# Patient Record
Sex: FEMALE | Race: WHITE | Employment: FULL TIME | URBAN - METROPOLITAN AREA
[De-identification: names, ages, dates, MRNs, and addresses within clinical notes are randomized per-mention and may not be internally consistent; named-entity substitution may affect disease eponyms.]

---

## 2020-01-10 ENCOUNTER — HOSPITAL ENCOUNTER (EMERGENCY)
Age: 30
Discharge: HOME | End: 2020-01-10
Payer: COMMERCIAL

## 2020-01-10 VITALS
HEIGHT: 62 IN | WEIGHT: 135 LBS | DIASTOLIC BLOOD PRESSURE: 80 MMHG | RESPIRATION RATE: 17 BRPM | BODY MASS INDEX: 24.84 KG/M2 | OXYGEN SATURATION: 99 % | SYSTOLIC BLOOD PRESSURE: 112 MMHG | TEMPERATURE: 97.1 F | HEART RATE: 91 BPM

## 2020-01-10 PROCEDURE — 97140 MANUAL THERAPY 1/> REGIONS: CPT

## 2020-01-10 PROCEDURE — 99282 EMERGENCY DEPT VISIT SF MDM: CPT

## 2020-01-10 PROCEDURE — 97110 THERAPEUTIC EXERCISES: CPT

## 2020-01-10 PROCEDURE — 97161 PT EVAL LOW COMPLEX 20 MIN: CPT

## 2020-01-10 PROCEDURE — 6370000000 HC RX 637 (ALT 250 FOR IP): Performed by: NURSE PRACTITIONER

## 2020-01-10 RX ORDER — METHOCARBAMOL 500 MG/1
500 TABLET, FILM COATED ORAL 3 TIMES DAILY
Qty: 15 TABLET | Refills: 0 | Status: SHIPPED | OUTPATIENT
Start: 2020-01-10 | End: 2020-01-15

## 2020-01-10 RX ORDER — PREDNISONE 20 MG/1
60 TABLET ORAL ONCE
Status: COMPLETED | OUTPATIENT
Start: 2020-01-10 | End: 2020-01-10

## 2020-01-10 RX ORDER — PREDNISONE 20 MG/1
TABLET ORAL
Qty: 18 TABLET | Refills: 0 | Status: SHIPPED | OUTPATIENT
Start: 2020-01-10

## 2020-01-10 RX ORDER — METHOCARBAMOL 500 MG/1
500 TABLET, FILM COATED ORAL ONCE
Status: DISCONTINUED | OUTPATIENT
Start: 2020-01-10 | End: 2020-01-10 | Stop reason: HOSPADM

## 2020-01-10 RX ADMIN — PREDNISONE 60 MG: 20 TABLET ORAL at 10:51

## 2020-01-10 ASSESSMENT — PAIN DESCRIPTION - ORIENTATION: ORIENTATION: RIGHT

## 2020-01-10 ASSESSMENT — ENCOUNTER SYMPTOMS
ABDOMINAL PAIN: 0
COLOR CHANGE: 0
RHINORRHEA: 0
SHORTNESS OF BREATH: 0
SORE THROAT: 0

## 2020-01-10 ASSESSMENT — PAIN SCALES - GENERAL: PAINLEVEL_OUTOF10: 10

## 2020-01-10 ASSESSMENT — PAIN DESCRIPTION - PAIN TYPE: TYPE: ACUTE PAIN

## 2020-01-10 ASSESSMENT — PAIN DESCRIPTION - LOCATION: LOCATION: NECK

## 2020-01-10 NOTE — PROGRESS NOTES
Outpatient Physical Therapy Phone: [phone], Fax: [phone]   ED Physical Therapy Phone: [phone]    CERVICAL, THORACIC SPINE, AND UPPER EXTREMITY PHYSICAL THERAPY EVALUATION  EMERGENCY DEPARTMENT     Received referral for Physical Therapy Evaluation in the Emergency Department. Pt educated to role of PT in the ED, and pt agreeable to proceed with evaluation. Evaluation Date: 1/10/2020   Patient Name: Octavia Villarreal   YOB: 1990    Medical Diagnosis:  Neck pain  Treatment Diagnosis:  1st/2nd rib dys, muscle strain/spasm R UT and R levator scap  Onset Date:   2 weeks  Referral Date: 1/10/2020   Referring Provider: VIMAL Carranza CNP  Restrictions/Precautions: none per NP      SUBJECTIVE FINDINGS    History of Present Illness:      Pt presents to ED with c/o R-sided neck pain. Pt first noticed pain apx 2 weeks ago after moving into a new place and sleeping on an air mattress. States she was able to massage and ease pain but this morning pain was worse. Denies headaches. Denies numbness/tingling. 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 Pain    Patient describes pain to be achy, dull, sore   Patient reports /10 pain at present,  8-9/10 pain at its worst  Worsened by RUE movements  Improved by Ibuprofen, massaging  Pain increases by coughing, sneezing, and laughing:  []   Yes [x]   No    Current Functional Limitations:   PLOF:   Pt. Sleep affected?  []   Yes []   No   []N/A      OBJECTIVE FINDINGS    Imaging Results:  Performed in ED today, 1/10/2020: None indicated    [employer]   [x]   Rule does not apply if any of the following are present:     -No Neck pain  -Unstable Vital Signs     -Non Trauma   -Known vertebral disease      -GCS <15   -Acute Paralysis   -Age <16  -Previous C-Spine Surgery    []  Imaging Indicated if:  []   Age 72 or older  []   Paresthesia in extremities  []   Dangerous Mechanism     []   Low Risk Factors allowing safe ROM  []   Simple Rear-End MVC  []   Sitting position in ED  []   Ambulatory into ED  []   Delayed onset of neck pain (Not immediate at time of injury)  []   Absence of MidlineCspine tenderness     []   Actively rotates head to 45 degrees bilat       Posture    [x]   Forward head  []   Forward flexed trunk   []   Pronounced CT junction    []   Increased thoracic kyphosis   []   Increased lumbar lordosis   []   Scoliosis   []   Swayback  []   Decreased WB on   []   R   []   L   []   Scapular winging  []   Other:       Palpation/Tenderness/Visual Inspection      Patient reported tenderness with palpation  [x]   Yes   []   No   []   NT  Location:  R UT, R levator scap, R cervical paraspinals  PT notes warmth  []   Yes   [x]   No   []   NT  Location:   PT notes increased muscle tone   []   Yes   [x]   No   []   NT  Location:   Ligament tenderness/provocation:   []   Yes   [x]   No   []   NT  Location:      Range of Motion/Strength Testing/Myotomes    [x] All ROM and strength WNL except as marked below  *denotes pain  Range Tested AROM PROM Resisted/Myotomes    Left (or  neutral)  Right Left Right Left (or neutral) Right   Cervical Flex (C1-2) Dec 25%        Cervical Ext Dec 25%        Cervical SB (C3) Dec 25% Dec 50%       Cervical Rot Dec 25% Dec 25%       Shoulder Shrug (C4)         Shoulder Flex  Dec 25%       Shoulder Ext         Shoulder Abd (C5)  Dec 25%       Shoulder Add         Shoulder IR         Shoulder ER          Elbow flex (C6)         Elbow ext (C7)         Wrist ext (C6)         Wrist flex (C7)         Supination          Pronation         Thumb Ext (C8)         Intrinsics (T1)           UE Dermatomes     [x] All dermatomes WNL except as marked below   Dermatomes  Left  Right Comments   Posterior Head (C2)      Lateral Upper Neck (C3)      Supraclavicular (C4)      Lateral Upper Arm (C5)      Lateral Forearm/1st (C6)      3rd digit (C7)      5th digit (C8)      Medial Arm (T1)        Reflexes      [x]

## 2020-01-10 NOTE — ED PROVIDER NOTES
were reviewed and negative. PAST MEDICAL HISTORY   History reviewed. No pertinent past medical history. SURGICAL HISTORY       Past Surgical History:   Procedure Laterality Date    FINGER SURGERY Left          CURRENT MEDICATIONS       Previous Medications    No medications on file         ALLERGIES     Patient has no known allergies. FAMILY HISTORY     History reviewed. No pertinent family history. SOCIAL HISTORY       Social History     Socioeconomic History    Marital status: Single     Spouse name: None    Number of children: None    Years of education: None    Highest education level: None   Occupational History    None   Social Needs    Financial resource strain: None    Food insecurity:     Worry: None     Inability: None    Transportation needs:     Medical: None     Non-medical: None   Tobacco Use    Smoking status: Never Smoker   Substance and Sexual Activity    Alcohol use: Yes     Comment: occassionally    Drug use: Not Currently    Sexual activity: None   Lifestyle    Physical activity:     Days per week: None     Minutes per session: None    Stress: None   Relationships    Social connections:     Talks on phone: None     Gets together: None     Attends Jewish service: None     Active member of club or organization: None     Attends meetings of clubs or organizations: None     Relationship status: None    Intimate partner violence:     Fear of current or ex partner: None     Emotionally abused: None     Physically abused: None     Forced sexual activity: None   Other Topics Concern    None   Social History Narrative    None       SCREENINGS             PHYSICAL EXAM  (up to 7 for level 4, 8 or more for level 5)     ED Triage Vitals [01/10/20 0916]   BP Temp Temp src Pulse Resp SpO2 Height Weight   112/80 97.1 °F (36.2 °C) -- 91 17 99 % 5' 2\" (1.575 m) 135 lb (61.2 kg)       Physical Exam  Constitutional:       Appearance: She is well-developed.    HENT: words are mis-transcribed.)    VIMAL Montiel CNP (electronically signed)         VIMAL Montiel CNP  01/10/20 1034

## 2020-01-15 ENCOUNTER — HOSPITAL ENCOUNTER (OUTPATIENT)
Dept: PHYSICAL THERAPY | Age: 30
Setting detail: THERAPIES SERIES
Discharge: HOME | End: 2020-01-15
Payer: COMMERCIAL

## 2020-01-15 PROCEDURE — 97161 PT EVAL LOW COMPLEX 20 MIN: CPT

## 2020-01-15 PROCEDURE — 97140 MANUAL THERAPY 1/> REGIONS: CPT

## 2020-01-15 PROCEDURE — 97110 THERAPEUTIC EXERCISES: CPT

## 2020-01-15 NOTE — PROGRESS NOTES
The following patient has been evaluated for physical therapy services. In order for therapy to continue, physician review of the treatment plan is needed. Please review the attached evaluation and/or summary of the patient's plan of care, and verify that you agree by signing below and sending it back to our office. Thank you for this referral.    Physician signature_______________________ Date________________    Fax to:   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 Social support/Environment:     Family/caregiver support:   [x]Yes   []No    Health History reviewed with pt:   [x]Yes   []No      History of R rotator cuff strain 1yr ago, treated non surgically and had physical therapy for a month. Reported relief post therapy session. SUBJECTIVE FINDINGS      History of Present Illness:  31yrs old female presented with R sided neck pain which started gradually with increase in pain with neck rotations during driving, overhead activities. She also reported stiffness in the early morning in the neck with difficulty to elevate B/L UEs above shoulder last Friday (Jan 10th, 2020) for which visited ED, had PT evaluation and had relief.  Patient currently presented 5/10 pain on the R sided neck (C3-C4) extending to R trapezius and R scapula. Patient did not report any weakness in the UEs, pins and needle sensation in the neck extending to the bilateral UEs. Pain       Patient describes pain to be R sided neck pain R sided neck to upper trapezius and and R scapula. 5/10 pain on neck movement. Rotations to L painful. Patient reports pain is  5/10 pain at present and  7/10 pain at its worst.  Worsened by neck rotations (driving), overhead activities. Improved by  Hot packs, lying down and sitting with moving the neck. Pt. reports pain with coughing, sneezing and laughing:   []Yes   [x]No    []NA      Current Functional Limitations:   [x]Yes   []No  Functional Complaints:    Driving for 1 hour a day. , sleeping   PLOF:   [x]No functional limitations   []Pt with previous functional limitations  Pt's sleep is affected? [x]Yes   []No    Wakes up a lot     Patient goal for therapy: \"To move the neck without pain\"    OBJECTIVE FINDINGS      Gait/Steps/Balance       [x]Gait WNL unless otherwise noted below:                             Deviations on a level linoleum surface include:      Balance tests (Negative)     Hautard's test:  [x]Neg  []Pos with R rot  []Pos with L rot  []Pos with ext  []NT  Romberg's sign:  EO    Sec,      Sway;   EC    Sec,      Sway     []NT  Unilateral stance:   EO R:    Sec;  EO L   Sec;   EC R:   Sec;  EC L   Sec     []NT     Posture:   [x]Forward head  []Forward flexed trunk   []Pronounced CT junction    [x]Increased thoracic kyphosis   []Increased lumbar lordosis   []Scoliosis   []Swayback  []Decreased WB on   []R   []L   []Scapular winging  [x]Other:  L sided thoracic non structural scoliosis extending from T3-T8.  (Scoliosis got corrected with forward bending and lying down)     Range of Motion   [x]Cervical Spine   []Thoracic Spine     Range Tested AROM PROM MMT/Resisted Tests   Flexion 50 degrees WFL    Extension 50 degrees [facility] Forearm/1st(C6)   Elbow flexion (C6)     3rd digit (C7)   Elbow extension (C7)     5th digit (C8)    Wrist extension (C6)     Medial Arm (T1)    Wrist flexion (C7)        Thumb Extension (C8)        Intrinsics (T1)      Comments:      Flexibility     Muscle Findings   Pectoralis Minor []WNL   [x]Decreased R   []Decreased L   []NT   Levator Scapula []WNL   [x]Decreased R   []Decreased L   []NT   Scalenes []WNL   []Decreased R   []Decreased L   []NT   Upper Trapezius  []WNL   [x]Decreased R   []Decreased L   []NT   Suboccipitals  []WNL   []Decreased R   []Decreased L   []NT   Other:  []WNL   []Decreased R   []Decreased L   []NT     Special Tests- cervical/thoracic seated     Special Test Findings   Icelandic c-spine rules  [x]Neg   []Pos   []NA   Adrianna Law [x]Neg   []Pos   []NT   Vertebral Artery/Positional Provocative Testing [x]Neg   []Pos R   []Pos L   []NT   Spurling's/Foraminal []Neg   []Pos R   []Pos L   []NT   Distraction [x]Relief noted   []No relief noted   []NT   Compression  []Neg   []Pos   [x]NT   Elevated Arm Stress Test (EAST)  [x]Neg   []Pos R   []Pos L   []NT   Thoracic Outlet   Other:  [x]Neg   []Pos R   []Pos L   []NT   Other:  []Neg   []Pos R   []Pos L   []NT   Other:  []Neg   []Pos R   []Pos L   []NT     Special Tests- UE seated     Special Test Findings   Empty can test/supraspinatus [x]Neg   []Pos R   []Pos L   []NT   Drop arm test [x]Neg   []Pos R   []Pos L   []NT   Neer's impingement []Neg   []Pos R   []Pos L   [x]NT   Speed's test  []Neg   []Pos R   []Pos L   [x]NT   Elbow varus stress []Neg   []Pos R   []Pos L   [x]NT   Elbow valgus stress  []Neg   []Pos R   []Pos L   [x]NT   Tinel's sign  []Neg   []Pos R   []Pos L   [x]NT  []Elbow   []Wrist    Finkelstein's test []Neg   []Pos R   []Pos L   [x]NT   Phalen's test  []Neg   []Pos R   []Pos L   [x]NT   Other []Neg   []Pos R   []Pos L   [x]NT   Other  []Neg   []Pos R   []Pos L   [x]NT     Palpation     Patient reported tenderness with palpation: Training   [x]Therapeutic Activity  []Other     Thank you for the referral of this patient.       Timed Code Treatment Minutes: 45 minutes     Total Treatment Time: 70 minutes    Stephanie Baird PT  License #

## 2020-01-15 NOTE — FLOWSHEET NOTE
Outpatient Physical Therapy     [x] Daily Treatment Note   [] Progress Note   [] Discharge Note    Date:  1/15/2020    Patient Name:  Rocio Cohen         YOB: 1990    Medical Diagnosis:   Torticollis                                        ICD 10:  M43.6     Treatment Diagnosis: 1st rib dysfunction, muscle strain/spasm R UT and R levator scapulae with nonstructural scoliosis (Thoracic extending from T3 - T8)                                 Onset Date: 3 weeks ago     Referral Date: 01/10/2020          Referring Physician: Jose A Kendrick (APRN - CNP)                                                    Visits Allowed/Insurance/Certification Information: 20 per calender year/ Co-pay: 30     Restrictions/Precautions: None    Plan of care sent to provider:      [x]Faxed  []Co-signature    (attempts: 1[x] 2 []3[])         Plan of care signed:      []Yes date:            []No      Progress Note covers period from (if applicable):    [x]NA    []From          To           Next Progress Note due:       Visit# / total visits:  1/7    Plan for Next Session:  Cervical manual traction, Scapular musculature strengthening, stretching of the R upper trapezius and R levator scapulae, modalities as needed. Subjective:  See eval     Pain level:   AT EVAL: Patient describes pain to be R sided neck pain R sided neck to upper trapezius and and R scapula. 5/10 pain on neck movement. Rotations to L painful. Patient reports pain is  5/10 pain at present and  7/10 pain at its worst.  Worsened by neck rotations (driving), overhead activities. Improved by  Hot packs, lying down and sitting with moving the neck. Objective:       Exercises:    Exercises in bold performed in department today. Items not bolded are carried forward from prior visits for continuity of the record.   Exercise/Equipment Resistance/Repetitions HEP Other comments     Neck ROM in supine and in sitting in all available planes of motion 10 reps x  3 sets/ 3 x day [x]      Chin tucks with towel roll under occiput in supine position 10 reps x 10 sec hold [x]      Chin tucks with neck flexion in supine position 10 reps x 2 sets [x]      Pectoralis minor stretches in supine 10 reps x 10 sec hold [x]      Upper trapezius and Levator scapulae stretches for R side  10 reps x 10 sec hold [x]      Scapular retraction exercise 10 reps x 2 sets []      Forward bending with UE support while in seated position 10 reps []        []        []          []         []        []        []        []        []        []        []        []      Therapeutic Exercise/Home Exercise Program: 30 minutes  See above  HEP was explained and demonstrated to the patient. Patient verbalized understanding. Group Therapy:    0 minutes    Therapeutic Activity:  0 minutes     Gait: 0 minutes    Neuromuscular Re-Education:  0 minutes      Canalith Repositioning Procedure:  0 minutes    Manual Therapy:  15 minutes  Patient was provided with Midthoracic spine manipulation, Cervicothoracic junction manipulation to Improve sagittal plane motion. Sagittal plane ROM of the neck improved from 90 degree to 110 degrees. Followed  1st Rib mobilization,  R upper trap and R Lev scapulae stretch, Trigger point release on R upper trap. Cervical manual traction for 1 min hold x 5 reps   Patient showed Improved R rotation AROM from 50 to 70 degrees. L side bending improved from 38 degrees to 45 degrees with neck pain reduced to 2/10 on VAS.     Modalities: 0 minutes    Functional Outcome Measure:   []NA  Measure Used: Oswestry Neck Disability Index  Date Assessed: 01/15/2020  Score: 12/50 (24% disability, moderate disability)    Assessment/Treatment/Activity Tolerance:    Patients response to treatment:   [x]Patient tolerated treatment well []Patient limited by fatigue   []Patient limited by pain  []Patient limited by other medical complications   []Other:     Goals:   Progress towards goals:  Goals established on 1/15/20     GOALS    Short Term Goals:     weeks Long Term Goals:     weeks   1). Establish HEP 1). Pt independent with HEP   2). Pain  3/10 or less 2). Pain 1 /10 or less   3). Patient will improve neck ROM on the L side with 10% decrease 3). Patient will be able to maintain normal neck AROM. 4). Patient will improve scapular musculature strength by 1 grade. 4). Patient will maintain scapular musculature strength of 5/5   5). Patient will show Oswestry neck disability index score to 15% disability 5). Patient will show Oswestry neck disability index score to 0% disability   6). 6).          Prognosis: [x]Good   []Fair   []Poor    Patient Requires Follow-up:  [x]Yes  []No    Plan: [x]Plan of care initiated     []Continue per plan of care    [] Alter current plan (see comments)    []Hold pending MD visit []Discharge    Timed Code Treatment Minutes: 45 minutes    Total Treatment Minutes:  70 minutes    Medicare Cap total YTD:   []N/A    Workers Comp Time Stamp  [x]N/A   Time In:   Time Out:    Electronically signed by:  June Quiroz PT

## 2020-01-21 ENCOUNTER — HOSPITAL ENCOUNTER (OUTPATIENT)
Dept: PHYSICAL THERAPY | Age: 30
Setting detail: THERAPIES SERIES
Discharge: HOME | End: 2020-01-21
Payer: COMMERCIAL

## 2020-01-21 PROCEDURE — 97110 THERAPEUTIC EXERCISES: CPT

## 2020-01-21 PROCEDURE — 97140 MANUAL THERAPY 1/> REGIONS: CPT

## 2020-01-21 NOTE — FLOWSHEET NOTE
Outpatient Physical Therapy     [x] Daily Treatment Note   [] Progress Note   [] Discharge Note    Date:  1/21/2020    Patient Name:  Filiberto Alarcon         YOB: 1990    Medical Diagnosis:   Torticollis                                        ICD 10:  M43.6     Treatment Diagnosis: 1st rib dysfunction, muscle strain/spasm R UT and R levator scapulae with nonstructural scoliosis (Thoracic extending from T3 - T8)                                 Onset Date: 3 weeks ago     Referral Date: 01/10/2020          Referring Physician: Charis LEWIS - ILANA)                                                    Visits Allowed/Insurance/Certification Information: 20 per calender year/ Co-pay: 30     Restrictions/Precautions: None    Plan of care sent to provider:      [x]Faxed  []Co-signature    (attempts: 1[x] 2 []3[])         Plan of care signed:      []Yes date:            []No      Progress Note covers period from (if applicable):    [x]NA    []From          To           Next Progress Note due:       Visit# / total visits:  2/7    Plan for Next Session:  Cervical manual traction, Scapular musculature strengthening, stretching of the R upper trapezius and R levator scapulae, modalities as needed. Subjective:    A lot of relief since last visit. Soreness beginning to come back this morning with some more soreness  Has performed stretches 2x since last visit  Has been able to put hair up easier with improved shoulder ER     Pain level: Currently: 2/10 R medial scapular; no pain at end of session  AT EVAL: Patient describes pain to be R sided neck pain R sided neck to upper trapezius and and R scapula. 5/10 pain on neck movement. Rotations to L painful. Patient reports pain is  5/10 pain at present and  7/10 pain at its worst.  Worsened by neck rotations (driving), overhead activities. Improved by  Hot packs, lying down and sitting with moving the neck.        Objective:       Exercises:

## 2020-01-23 ENCOUNTER — HOSPITAL ENCOUNTER (OUTPATIENT)
Dept: PHYSICAL THERAPY | Age: 30
Setting detail: THERAPIES SERIES
Discharge: HOME | End: 2020-01-23
Payer: COMMERCIAL

## 2020-01-23 PROCEDURE — 97140 MANUAL THERAPY 1/> REGIONS: CPT

## 2020-01-23 PROCEDURE — 97110 THERAPEUTIC EXERCISES: CPT

## 2020-01-23 NOTE — PROGRESS NOTES
Patient was seen for 3 Visits of PT. Initial eval date 01/15/2020. Mary Pedro Patient was not seen for additional visits due to meeting all goals. and to feeling better. As of 01/23/2020., pt MET 5/5 STGs and 5/5 LTGs. Pt has been discharged at this time, with recommendation to continue HEP as prepared. Please see attached treatment note for most recent status. Thank you for your referral of this patient. Wilmer Fischer, PT, DPT, OMT-C #[id number]          Outpatient Physical Therapy     [x] Daily Treatment Note   [] Progress Note   [x] Discharge Note    Date:  1/23/2020    Patient Name:  Liseth Mckinnon         YOB: 1990    Medical Diagnosis:   Torticollis                                        ICD 10:  M43.6     Treatment Diagnosis: 1st rib dysfunction, muscle strain/spasm R UT and R levator scapulae with nonstructural scoliosis (Thoracic extending from T3 - T8)                                 Onset Date: 3 weeks ago     Referral Date: 01/10/2020          Referring Physician: Miranda Stratton (APRN - CNP)                                                    Visits Allowed/Insurance/Certification Information: 20 per calender year/ Co-pay: 30     Restrictions/Precautions: None     Plan of care sent to provider:      [x]Faxed  []Co-signature    (attempts: 1[x] 2 []3[])         Plan of care signed:      []Yes date:            [x]No      Progress Note covers period from (if applicable):    []NA    [x]From   1/15/2020       To     1/23/20      Next Progress Note due:    NA: pt to DC    Visit# / total visits:  3/7    Plan for Next Session:  NA: pt to DC    Subjective:     Pt reports she has felt good since last session.  Mild soreness in R upper trap  Has been able to perform stretches but has not had time to perform strengthening exercises  Feels she is close to baseline and would like today to be last visit     Pain level: Currently: 0/10 ; no pain at end of session  AT EVAL: well as OB/GYN per patient request.   Discussed self management with self mobilization techniques as well as trigger point management    Scapular Strength    []? All WNL (5/5) except as marked below (measured out of 5)  []? NT                Scapula Left Right   Upper Trapezius       Middle Trapezius   5/5   Lower Trapezius   5/5   Rhomboid   5/5   Serratus Anterior     5/5    Latissimus Dorsi           Group Therapy:    0 minutes    Therapeutic Activity:  0 minutes     Gait: 0 minutes    Neuromuscular Re-Education:  0 minutes      Canalith Repositioning Procedure:  0 minutes    Manual Therapy:  20 minutes   Seated upper thoracic distraction/LIFT - significant cavitation noted  Seated CT distraction (\"Bear Hug\")  1st and 2nd rib mobility - WNL today  ISTM to R upper trap with HG2 sweeping technique   Relief achieved at end of session  Educated on myofascial affects especially through upper trap      Modalities: 0 minutes    Functional Outcome Measure:   []NA  Measure Used: Oswestry Neck Disability Index  Date Assessed: 01/15/2020  Score: 12/50 (24% disability, moderate disability)    Re-assessed 1/23/20  Score: 1/50 = 2% disability     Assessment/Treatment/Activity Tolerance:  Pt with significant improvement in pain, cervical ROM and scapular strength functioning at baseline level. Educated patient on self management as well as option of returning for therapy at a later time if issue returns. Pt with good understanding of HEP to continue      Patients response to treatment:   [x]Patient tolerated treatment well []Patient limited by fatigue   []Patient limited by pain  []Patient limited by other medical complications   []Other:     Goals:   Progress towards goals:  As of 1/23/20 5/5 STGs and 5/5 LTGs    GOALS    Short Term Goals:     weeks Long Term Goals:     weeks   1). Establish HEP - MET 1). Pt independent with HEP - MET   2). Pain  3/10 or less - MET 2). Pain 1 /10 or less - MET   3).  Patient will improve neck ROM on the L side with 10% decrease - MET 3). Patient will be able to maintain normal neck AROM. - MET   4). Patient will improve scapular musculature strength by 1 grade.- MET 4). Patient will maintain scapular musculature strength of 5/5 - MET   5). Patient will show Oswestry neck disability index score to 15% disability - MET 5). Patient will show Oswestry neck disability index score to 0% disability - Mostly MET (scoring 2% from initial 24%)   6). 6).         Patient Requires Follow-up:  []Yes  [x]No    Plan: []Plan of care initiated     []Continue per plan of care    [] Alter current plan (see comments)    []Hold pending MD visit [x]Discharge    Timed Code Treatment Minutes: 30 minutes    Total Treatment Minutes:  30 minutes      Electronically signed by:  Tamra Alfonso PT, DPT, OMT-C #[id number]

## 2020-01-28 ENCOUNTER — HOSPITAL ENCOUNTER (OUTPATIENT)
Dept: PHYSICAL THERAPY | Age: 30
Setting detail: THERAPIES SERIES
End: 2020-01-28
Payer: COMMERCIAL

## 2020-01-30 ENCOUNTER — APPOINTMENT (OUTPATIENT)
Dept: PHYSICAL THERAPY | Age: 30
End: 2020-01-30
Payer: COMMERCIAL

## 2020-07-22 ENCOUNTER — OFFICE VISIT (OUTPATIENT)
Dept: INTERNAL MEDICINE CLINIC | Age: 30
End: 2020-07-22
Payer: COMMERCIAL

## 2020-07-22 VITALS
SYSTOLIC BLOOD PRESSURE: 120 MMHG | BODY MASS INDEX: 32.97 KG/M2 | HEIGHT: 61 IN | OXYGEN SATURATION: 99 % | HEART RATE: 87 BPM | TEMPERATURE: 97.8 F | WEIGHT: 174.6 LBS | DIASTOLIC BLOOD PRESSURE: 70 MMHG

## 2020-07-22 LAB
A/G RATIO: 1.7 (ref 1.1–2.2)
ALBUMIN SERPL-MCNC: 4.5 G/DL (ref 3.4–5)
ALP BLD-CCNC: 44 U/L (ref 40–129)
ALT SERPL-CCNC: 20 U/L (ref 10–40)
ANION GAP SERPL CALCULATED.3IONS-SCNC: 13 MMOL/L (ref 3–16)
AST SERPL-CCNC: 15 U/L (ref 15–37)
BASOPHILS ABSOLUTE: 0.1 K/UL (ref 0–0.2)
BASOPHILS RELATIVE PERCENT: 0.8 %
BILIRUB SERPL-MCNC: 0.4 MG/DL (ref 0–1)
BUN BLDV-MCNC: 18 MG/DL (ref 7–20)
CALCIUM SERPL-MCNC: 9.3 MG/DL (ref 8.3–10.6)
CHLORIDE BLD-SCNC: 104 MMOL/L (ref 99–110)
CHOLESTEROL, TOTAL: 208 MG/DL (ref 0–199)
CO2: 20 MMOL/L (ref 21–32)
CREAT SERPL-MCNC: 0.7 MG/DL (ref 0.6–1.1)
EOSINOPHILS ABSOLUTE: 0.1 K/UL (ref 0–0.6)
EOSINOPHILS RELATIVE PERCENT: 1.8 %
GFR AFRICAN AMERICAN: >60
GFR NON-AFRICAN AMERICAN: >60
GLOBULIN: 2.7 G/DL
GLUCOSE BLD-MCNC: 89 MG/DL (ref 70–99)
HCT VFR BLD CALC: 41.6 % (ref 36–48)
HDLC SERPL-MCNC: 64 MG/DL (ref 40–60)
HEMOGLOBIN: 14 G/DL (ref 12–16)
LDL CHOLESTEROL CALCULATED: 136 MG/DL
LYMPHOCYTES ABSOLUTE: 2.2 K/UL (ref 1–5.1)
LYMPHOCYTES RELATIVE PERCENT: 33.5 %
MCH RBC QN AUTO: 29.4 PG (ref 26–34)
MCHC RBC AUTO-ENTMCNC: 33.7 G/DL (ref 31–36)
MCV RBC AUTO: 87.3 FL (ref 80–100)
MONOCYTES ABSOLUTE: 0.4 K/UL (ref 0–1.3)
MONOCYTES RELATIVE PERCENT: 6.1 %
NEUTROPHILS ABSOLUTE: 3.8 K/UL (ref 1.7–7.7)
NEUTROPHILS RELATIVE PERCENT: 57.8 %
PDW BLD-RTO: 13.6 % (ref 12.4–15.4)
PLATELET # BLD: 313 K/UL (ref 135–450)
PMV BLD AUTO: 7.6 FL (ref 5–10.5)
POTASSIUM SERPL-SCNC: 4.2 MMOL/L (ref 3.5–5.1)
RBC # BLD: 4.77 M/UL (ref 4–5.2)
SODIUM BLD-SCNC: 137 MMOL/L (ref 136–145)
TOTAL PROTEIN: 7.2 G/DL (ref 6.4–8.2)
TRIGL SERPL-MCNC: 42 MG/DL (ref 0–150)
VLDLC SERPL CALC-MCNC: 8 MG/DL
WBC # BLD: 6.6 K/UL (ref 4–11)

## 2020-07-22 PROCEDURE — 1036F TOBACCO NON-USER: CPT | Performed by: INTERNAL MEDICINE

## 2020-07-22 PROCEDURE — 36415 COLL VENOUS BLD VENIPUNCTURE: CPT | Performed by: INTERNAL MEDICINE

## 2020-07-22 PROCEDURE — 99203 OFFICE O/P NEW LOW 30 MIN: CPT | Performed by: INTERNAL MEDICINE

## 2020-07-22 PROCEDURE — 90715 TDAP VACCINE 7 YRS/> IM: CPT | Performed by: INTERNAL MEDICINE

## 2020-07-22 PROCEDURE — G8427 DOCREV CUR MEDS BY ELIG CLIN: HCPCS | Performed by: INTERNAL MEDICINE

## 2020-07-22 PROCEDURE — G8417 CALC BMI ABV UP PARAM F/U: HCPCS | Performed by: INTERNAL MEDICINE

## 2020-07-22 PROCEDURE — 90471 IMMUNIZATION ADMIN: CPT | Performed by: INTERNAL MEDICINE

## 2020-07-22 PROCEDURE — 99385 PREV VISIT NEW AGE 18-39: CPT | Performed by: INTERNAL MEDICINE

## 2020-07-22 RX ORDER — CETIRIZINE HYDROCHLORIDE 10 MG/1
10 TABLET ORAL DAILY
COMMUNITY

## 2020-07-22 ASSESSMENT — PATIENT HEALTH QUESTIONNAIRE - PHQ9
1. LITTLE INTEREST OR PLEASURE IN DOING THINGS: 0
SUM OF ALL RESPONSES TO PHQ9 QUESTIONS 1 & 2: 0
SUM OF ALL RESPONSES TO PHQ QUESTIONS 1-9: 0
SUM OF ALL RESPONSES TO PHQ QUESTIONS 1-9: 0
2. FEELING DOWN, DEPRESSED OR HOPELESS: 0

## 2020-07-22 NOTE — PROGRESS NOTES
[facility]  History and Physical  Rabia Kaur M.D. Silas Fong  YOB: 1990    Date of Service:  7/22/2020    Chief Complaint:   Silas oFng is a 26 y.o. female who presents for   Chief Complaint   Patient presents with   Christian Establish Care    Neck Pain       HPI: Here for Annual Physical and Follow up. She complain of right neck pain while in the shower washing her hair 3 days ago. She tried 2 Advil without relief and prefer not to take med and go for PT instead since it did help in the past.    No results found for: LABA1C, LABMICR  No results found for: NA, K, CL, CO2, BUN, CREATININE, GLUCOSE, CALCIUM  No results found for: CHOL, TRIG, HDL, LDLCALC, LDLDIRECT  No results found for: ALT, AST  No results found for: TSH, T4FREE  No results found for: WBC, HGB, HCT, MCV, PLT  No results found for: INR   No results found for: PSA   No results found for: LABURIC     Wt Readings from Last 3 Encounters:   07/22/20 174 lb 9.6 oz (79.2 kg)   01/10/20 135 lb (61.2 kg)     BP Readings from Last 3 Encounters:   07/22/20 120/70   01/10/20 112/80       There is no problem list on file for this patient.       No Known Allergies  Outpatient Medications Marked as Taking for the 7/22/20 encounter (Office Visit) with Tana Toledo MD   Medication Sig Dispense Refill    cetirizine (ZYRTEC) 10 MG tablet Take 10 mg by mouth daily         Past Medical History:   Diagnosis Date    GERD (gastroesophageal reflux disease)      Past Surgical History:   Procedure Laterality Date    FINGER SURGERY Left 2007     Family History   Problem Relation Age of Onset    Cancer Maternal Grandmother         pancreatic    Breast Cancer Paternal Grandmother     Cancer Paternal Grandmother      Social History     Socioeconomic History    Marital status: Single     Spouse name: Not on file    Number of children: Not on file    Years of education: Not on file    Highest education level: Not on file Occupational History    Occupation:    Social Needs    Financial resource strain: Not on file    Food insecurity     Worry: Not on file     Inability: Not on file   [employer] needs     Medical: Not on file     Non-medical: Not on file   Tobacco Use    Smoking status: Never Smoker    Smokeless tobacco: Never Used   Substance and Sexual Activity    Alcohol use: Yes     Comment: occassionally    Drug use: Never    Sexual activity: Yes   Lifestyle    Physical activity     Days per week: Not on file     Minutes per session: Not on file    Stress: Not on file   Relationships    Social connections     Talks on phone: Not on file     Gets together: Not on file     Attends Church service: Not on file     Active member of club or organization: Not on file     Attends meetings of clubs or organizations: Not on file     Relationship status: Not on file    Intimate partner violence     Fear of current or ex partner: Not on file     Emotionally abused: Not on file     Physically abused: Not on file     Forced sexual activity: Not on file   Other Topics Concern    Not on file   Social History Narrative    Not on file       Review of Systems:  A comprehensive review of systems was negative except for what was noted in the HPI. Physical Exam:   Vitals:    07/22/20 1247   BP: 120/70   Pulse: 87   Temp: 97.8 °F (36.6 °C)   TempSrc: Infrared   SpO2: 99%   Weight: 174 lb 9.6 oz (79.2 kg)   Height: 5' 1\" (1.549 m)     Body mass index is 32.99 kg/m². Constitutional: She is oriented to person, place, and time. She appears well-developed and well-nourished. No distress. HEENT:   Head: Normocephalic and atraumatic. Right Ear: Tympanic membrane, external ear and ear canal normal.   Left Ear: Tympanic membrane, external ear and ear canal normal.   Mouth/Throat: Oropharynx is clear and moist, and mucous membranes are normal.  There is no cervical adenopathy.   Eyes: Conjunctivae and extraocular motions are normal. Pupils are equal, round, and reactive to light. Neck: Supple. No JVD present. Carotid bruit is not present. No mass and no thyromegaly present. Cardiovascular: Normal rate, regular rhythm, normal heart sounds and intact distal pulses. Exam reveals no gallop and no friction rub. No murmur heard. Pulmonary/Chest: Effort normal and breath sounds normal. No respiratory distress. She has no wheezes, rhonchi or rales. Abdominal: Soft, non-tender. Bowel sounds and aorta are normal. She exhibits no organomegaly, mass or bruit. Musculoskeletal: Normal range of motion, no synovitis. She exhibits no edema. Neurological: She is alert and oriented to person, place, and time. She has normal reflexes. No cranial nerve deficit. Coordination normal.   Skin: Skin is warm and dry. There is no rash or erythema. No suspicious lesions noted. Psychiatric: She has a normal mood and affect.  Her speech is normal and behavior is normal. Judgment, cognition and memory are normal.       Preventive Care:  Health Maintenance   Topic Date Due    Varicella vaccine (1 of 2 - 2-dose childhood series) 02/22/1989    HIV screen  02/22/2003    DTaP/Tdap/Td vaccine (1 - Tdap) 02/22/2007    Cervical cancer screen  02/22/2009    Flu vaccine (1) 09/01/2020    Hepatitis A vaccine  Aged Out    Hepatitis B vaccine  Aged Out    Hib vaccine  Aged Out    Meningococcal (ACWY) vaccine  Aged Out    Pneumococcal 0-64 years Vaccine  Aged Out      Hx abnormal PAP: no  Sexual activity: has sex with males   Last eye exam: many years, normal  Exercise: walks 6 time(s) per week       Preventive plan of care for \[facility] [place] [facility]\""        7/22/2020           Preventive Measures Status       Recommendations for screening                   Diabetes Screen  No results found for: GLUCOSE Test recommended and ordered   Cholesterol Screen  No results found for: CHOL, TRIG, HDL, LDLCALC, LDLDIRECT Test recommended and ordered       Weight: Body mass index is 32.99 kg/m². 5' 1\" (1.549 m)174 lb 9.6 oz (79.2 kg)    Your BMI is 25 or greater, which indicates that you are overweight        Recommended Immunizations      There is no immunization history on file for this patient. Influenza vaccine:  recommended every fall  Tetanus vaccine:  tetanus and diptheria vaccine (Td) administered today- risks and benefits discussed         Other Recommendations ·   See a dentist every 6 months  · Try to get at least 30 minutes of exercise 3-5 days per week  · Always wear a seat belt when traveling in a car  · Always wear a helmet when riding a bicycle or motorcycle  · When exposed to the sun, use a sunscreen that protects against both UVA and UVB radiation with an SPF of 30 or greater- reapply every 2 to 3 hours or after sweating, drying off with a towel, or swimming  · You need 9123-6073 mg of calcium and 1813-7466 IU of vitamin D per day- it is possible to meet your calcium requirement with diet alone, but a vitamin D supplement is usually necessary                 Assessment/Plan:    [facility] was seen today for establish care and neck pain. Diagnoses and all orders for this visit:    Annual physical exam  -     Lipid Panel  -     Comprehensive Metabolic Panel  -     CBC Auto Differential    Screening for HIV (human immunodeficiency virus)  -     HIV Screen    Need for tetanus, diphtheria, and acellular pertussis (Tdap) vaccine in patient of adolescent age or older  -     Tdap (age 6y and older) IM ([address])    Neck strain, initial encounter  -     Feng Wiley    Other orders  -     Cancel: Tdap (age 6y and older) IM (BOOSTRIX)        Return Fasting Physical in 1 year.

## 2020-07-23 LAB
HIV AG/AB: NORMAL
HIV ANTIGEN: NORMAL
HIV-1 ANTIBODY: NORMAL
HIV-2 AB: NORMAL

## 2020-07-24 ENCOUNTER — HOSPITAL ENCOUNTER (OUTPATIENT)
Dept: PHYSICAL THERAPY | Age: 30
Setting detail: THERAPIES SERIES
Discharge: HOME | End: 2020-07-24
Payer: COMMERCIAL

## 2020-07-24 NOTE — FLOWSHEET NOTE
Physical Therapy  Cancellation/No-show Note    Patient Name:  Lala Cody  :  1990   Date:  2020  Cancels to Date:1  No-shows to Date: 0    For today's appointment patient:  [x]  Cancelled  []  Rescheduled appointment  []  No-show     Reason given by patient:  []  Patient ill  []  Conflicting appointment  []  No transportation    [x]  Conflict with work  []  No reason given  []  Other:     Comments:   Pt called, work schedule changed therefore cancelled Eval, and pt states she will call back in 3 weeks    Electronically signed by:  Irena Contreras, PT

## 2021-09-09 ENCOUNTER — APPOINTMENT (OUTPATIENT)
Dept: GENERAL RADIOLOGY | Age: 31
End: 2021-09-09
Payer: COMMERCIAL

## 2021-09-09 ENCOUNTER — HOSPITAL ENCOUNTER (EMERGENCY)
Age: 31
Discharge: HOME | End: 2021-09-09
Payer: COMMERCIAL

## 2021-09-09 VITALS
RESPIRATION RATE: 16 BRPM | TEMPERATURE: 98 F | SYSTOLIC BLOOD PRESSURE: 123 MMHG | HEART RATE: 72 BPM | OXYGEN SATURATION: 99 % | DIASTOLIC BLOOD PRESSURE: 86 MMHG

## 2021-09-09 DIAGNOSIS — R11.2 NON-INTRACTABLE VOMITING WITH NAUSEA, UNSPECIFIED VOMITING TYPE: ICD-10-CM

## 2021-09-09 DIAGNOSIS — R19.5 HEME POSITIVE STOOL: ICD-10-CM

## 2021-09-09 DIAGNOSIS — R10.13 ABDOMINAL PAIN, EPIGASTRIC: Primary | ICD-10-CM

## 2021-09-09 LAB
A/G RATIO: 1.6 (ref 1.1–2.2)
ALBUMIN SERPL-MCNC: 4.8 G/DL (ref 3.4–5)
ALP BLD-CCNC: 74 U/L (ref 40–129)
ALT SERPL-CCNC: 43 U/L (ref 10–40)
ANION GAP SERPL CALCULATED.3IONS-SCNC: 15 MMOL/L (ref 3–16)
AST SERPL-CCNC: 25 U/L (ref 15–37)
BASOPHILS ABSOLUTE: 0 K/UL (ref 0–0.2)
BASOPHILS RELATIVE PERCENT: 0.8 %
BILIRUB SERPL-MCNC: 0.6 MG/DL (ref 0–1)
BILIRUBIN URINE: NEGATIVE
BLOOD, URINE: ABNORMAL
BUN BLDV-MCNC: 9 MG/DL (ref 7–20)
CALCIUM SERPL-MCNC: 9.3 MG/DL (ref 8.3–10.6)
CHLORIDE BLD-SCNC: 106 MMOL/L (ref 99–110)
CLARITY: CLEAR
CO2: 20 MMOL/L (ref 21–32)
COLOR: YELLOW
CREAT SERPL-MCNC: 0.7 MG/DL (ref 0.6–1.1)
EOSINOPHILS ABSOLUTE: 0 K/UL (ref 0–0.6)
EOSINOPHILS RELATIVE PERCENT: 0.8 %
EPITHELIAL CELLS, UA: ABNORMAL /HPF (ref 0–5)
GFR AFRICAN AMERICAN: >60
GFR NON-AFRICAN AMERICAN: >60
GLOBULIN: 3 G/DL
GLUCOSE BLD-MCNC: 96 MG/DL (ref 70–99)
GLUCOSE URINE: NEGATIVE MG/DL
HCG QUALITATIVE: NEGATIVE
HCT VFR BLD CALC: 43.5 % (ref 36–48)
HEMOGLOBIN: 14.9 G/DL (ref 12–16)
KETONES, URINE: NEGATIVE MG/DL
LEUKOCYTE ESTERASE, URINE: NEGATIVE
LIPASE: 28 U/L (ref 13–60)
LYMPHOCYTES ABSOLUTE: 2.2 K/UL (ref 1–5.1)
LYMPHOCYTES RELATIVE PERCENT: 37.2 %
MCH RBC QN AUTO: 29.7 PG (ref 26–34)
MCHC RBC AUTO-ENTMCNC: 34.4 G/DL (ref 31–36)
MCV RBC AUTO: 86.5 FL (ref 80–100)
MICROSCOPIC EXAMINATION: YES
MONOCYTES ABSOLUTE: 0.4 K/UL (ref 0–1.3)
MONOCYTES RELATIVE PERCENT: 7.2 %
MUCUS: ABNORMAL /LPF
NEUTROPHILS ABSOLUTE: 3.1 K/UL (ref 1.7–7.7)
NEUTROPHILS RELATIVE PERCENT: 54 %
NITRITE, URINE: NEGATIVE
OCCULT BLOOD DIAGNOSTIC: ABNORMAL
PDW BLD-RTO: 13.6 % (ref 12.4–15.4)
PH UA: 6 (ref 5–8)
PLATELET # BLD: 303 K/UL (ref 135–450)
PMV BLD AUTO: 7.4 FL (ref 5–10.5)
POTASSIUM REFLEX MAGNESIUM: 4.1 MMOL/L (ref 3.5–5.1)
PROTEIN UA: NEGATIVE MG/DL
RBC # BLD: 5.02 M/UL (ref 4–5.2)
RBC UA: ABNORMAL /HPF (ref 0–4)
SODIUM BLD-SCNC: 141 MMOL/L (ref 136–145)
SPECIFIC GRAVITY UA: >=1.03 (ref 1–1.03)
TOTAL PROTEIN: 7.8 G/DL (ref 6.4–8.2)
URINE REFLEX TO CULTURE: ABNORMAL
URINE TYPE: ABNORMAL
UROBILINOGEN, URINE: 0.2 E.U./DL
WBC # BLD: 5.8 K/UL (ref 4–11)
WBC UA: ABNORMAL /HPF (ref 0–5)

## 2021-09-09 PROCEDURE — 71045 X-RAY EXAM CHEST 1 VIEW: CPT

## 2021-09-09 PROCEDURE — 81001 URINALYSIS AUTO W/SCOPE: CPT

## 2021-09-09 PROCEDURE — 80053 COMPREHEN METABOLIC PANEL: CPT

## 2021-09-09 PROCEDURE — 2580000003 HC RX 258: Performed by: PHYSICIAN ASSISTANT

## 2021-09-09 PROCEDURE — 96374 THER/PROPH/DIAG INJ IV PUSH: CPT

## 2021-09-09 PROCEDURE — 82270 OCCULT BLOOD FECES: CPT

## 2021-09-09 PROCEDURE — C9113 INJ PANTOPRAZOLE SODIUM, VIA: HCPCS | Performed by: PHYSICIAN ASSISTANT

## 2021-09-09 PROCEDURE — 99284 EMERGENCY DEPT VISIT MOD MDM: CPT

## 2021-09-09 PROCEDURE — 85025 COMPLETE CBC W/AUTO DIFF WBC: CPT

## 2021-09-09 PROCEDURE — 96375 TX/PRO/DX INJ NEW DRUG ADDON: CPT

## 2021-09-09 PROCEDURE — 83690 ASSAY OF LIPASE: CPT

## 2021-09-09 PROCEDURE — 84703 CHORIONIC GONADOTROPIN ASSAY: CPT

## 2021-09-09 PROCEDURE — 6360000002 HC RX W HCPCS: Performed by: PHYSICIAN ASSISTANT

## 2021-09-09 RX ORDER — 0.9 % SODIUM CHLORIDE 0.9 %
1000 INTRAVENOUS SOLUTION INTRAVENOUS ONCE
Status: COMPLETED | OUTPATIENT
Start: 2021-09-09 | End: 2021-09-09

## 2021-09-09 RX ORDER — ONDANSETRON 2 MG/ML
4 INJECTION INTRAMUSCULAR; INTRAVENOUS ONCE
Status: COMPLETED | OUTPATIENT
Start: 2021-09-09 | End: 2021-09-09

## 2021-09-09 RX ORDER — PANTOPRAZOLE SODIUM 40 MG/10ML
40 INJECTION, POWDER, LYOPHILIZED, FOR SOLUTION INTRAVENOUS ONCE
Status: COMPLETED | OUTPATIENT
Start: 2021-09-09 | End: 2021-09-09

## 2021-09-09 RX ORDER — ONDANSETRON 4 MG/1
4 TABLET, FILM COATED ORAL EVERY 8 HOURS PRN
Qty: 10 TABLET | Refills: 0 | Status: SHIPPED | OUTPATIENT
Start: 2021-09-09

## 2021-09-09 RX ORDER — SUCRALFATE 1 G/1
1 TABLET ORAL 4 TIMES DAILY
Qty: 56 TABLET | Refills: 0 | Status: SHIPPED | OUTPATIENT
Start: 2021-09-09 | End: 2021-09-23

## 2021-09-09 RX ORDER — PANTOPRAZOLE SODIUM 40 MG/1
40 TABLET, DELAYED RELEASE ORAL
Qty: 30 TABLET | Refills: 0 | Status: SHIPPED | OUTPATIENT
Start: 2021-09-09

## 2021-09-09 RX ADMIN — ONDANSETRON HYDROCHLORIDE 4 MG: 2 INJECTION, SOLUTION INTRAMUSCULAR; INTRAVENOUS at 11:04

## 2021-09-09 RX ADMIN — SODIUM CHLORIDE 1000 ML: 9 INJECTION, SOLUTION INTRAVENOUS at 11:04

## 2021-09-09 RX ADMIN — PANTOPRAZOLE SODIUM 40 MG: 40 INJECTION, POWDER, FOR SOLUTION INTRAVENOUS at 11:49

## 2021-09-09 ASSESSMENT — ENCOUNTER SYMPTOMS
ABDOMINAL PAIN: 1
SHORTNESS OF BREATH: 0
NAUSEA: 1
VOMITING: 1

## 2021-09-09 NOTE — ED NOTES
Pt discharged in stable, ambulatory condition with all belongings. Discharge instructions reviewed with pt, pt verbalized understanding.      Francesco Cadena RN  09/09/21 6639

## 2021-09-09 NOTE — ED PROVIDER NOTES
[address] [facility]  EMERGENCY DEPARTMENT ENCOUNTER        Pt Name: Dheeraj Flores  MRN: [id number]  [place] 1990  Date of evaluation: 9/9/2021  Provider: Lilia Zaldivar PA-C  PCP: Randlel Bloom MD    Shared Visit or Autonomous Visit: NORIS. I have evaluated this patient. My supervising physician was available for consultation. CHIEF COMPLAINT       Chief Complaint   Patient presents with    Positive For Covid-19     1st; three days vomiting       HISTORY OF PRESENT ILLNESS   (Location/Symptom, Timing/Onset, Context/Setting, Quality, Duration, Modifying Factors, Severity)  Note limiting factors. Dheeraj Flores is a 33 y.o. female presenting to the emergency department for evaluation epigastric abdominal pain coming and going, vomiting past 3 days has noticed flecks of blood in her abdominal pain states last week she noticed black stool states it was after drinking a Slim fast milkshake unsure if this was related has not noticed any further black or bloody stools. She is currently menstruating. Has history of GERD. Had been on Nexium in the past she restarted taking generic Nexium about 2 months ago. Will intermittently give epigastric burning sensation. She is Covid positive tested +8 days ago states she had fevers for the first few days and now have resolved. She did lose her sense of taste and smell but that has come back now also. Cough has improved. No chest pain. Currently denies abdominal pain. Last saw flecks of blood in her vomit yesterday, none today. The history is provided by the patient.    Nausea & Vomiting  Timing:  Intermittent  Able to tolerate:  Liquids and solids  Chronicity:  New  Associated symptoms: abdominal pain    Associated symptoms: no fever    Abdominal pain:     Location:  Epigastric    Quality: burning      Onset quality:  Gradual    Timing:  Intermittent    Progression:  Unchanged    Chronicity:  Recurrent      Nursing Notes were in the Last Year:    [address] in the Last Year:    Transportation Needs:     Lack of Transportation (Medical):  Lack of Transportation (Non-Medical):    Physical Activity:     Days of Exercise per Week:     Minutes of Exercise per Session:    Stress:     Feeling of Stress :    Social Connections:     Frequency of Communication with Friends and Family:     Frequency of Social Gatherings with Friends and Family:     Attends Yazdanism Services:     Active Member of Clubs or Organizations:     Attends Club or Organization Meetings:     Marital Status:    Intimate Partner Violence:     Fear of Current or Ex-Partner:     Emotionally Abused:     Physically Abused:     Sexually Abused:        SCREENINGS             PHYSICAL EXAM    (up to 7 for level 4, 8 or more for level 5)     /78   Pulse 71   Temp 98 °F (36.7 °C)   Resp 18   LMP 09/08/2021   SpO2 99%     Physical Exam  Vitals and nursing note reviewed. Exam conducted with a chaperone present. Constitutional:       Appearance: She is well-developed. She is not toxic-appearing. HENT:      Head: Normocephalic and atraumatic. Mouth/Throat:      Mouth: Mucous membranes are moist.      Pharynx: Oropharynx is clear. No pharyngeal swelling, oropharyngeal exudate or posterior oropharyngeal erythema. Eyes:      Conjunctiva/sclera: Conjunctivae normal.      Pupils: Pupils are equal, round, and reactive to light. Neck:      Vascular: No JVD. Cardiovascular:      Rate and Rhythm: Normal rate and regular rhythm. Pulses: Normal pulses. Heart sounds: Normal heart sounds. Pulmonary:      Effort: Pulmonary effort is normal. No respiratory distress. Breath sounds: Normal breath sounds. No stridor. No wheezing, rhonchi or rales. Abdominal:      General: Bowel sounds are normal. There is no distension. Palpations: Abdomen is soft. Abdomen is not rigid. There is no mass. Tenderness:  There is no abdominal tenderness. There is no guarding or rebound. Genitourinary:     Rectum: No tenderness or external hemorrhoid. Normal anal tone. Comments: Scant light brown stool on JENY. No rectal bleeding on exam.  Musculoskeletal:         General: Normal range of motion. Cervical back: Normal range of motion and neck supple. Skin:     General: Skin is warm and dry. Findings: No rash. Neurological:      Mental Status: She is alert and oriented to person, place, and time. GCS: GCS eye subscore is 4. GCS verbal subscore is 5. GCS motor subscore is 6. Cranial Nerves: No cranial nerve deficit. Sensory: No sensory deficit. Motor: No abnormal muscle tone.       Coordination: Coordination normal.   Psychiatric:         Behavior: Behavior normal.         DIAGNOSTIC RESULTS   LABS:    Labs Reviewed   COMPREHENSIVE METABOLIC PANEL W/ REFLEX TO MG FOR LOW K - Abnormal; Notable for the following components:       Result Value    CO2 20 (*)     ALT 43 (*)     All other components within normal limits    Narrative:     Performed at:  [facility] [address],  [employer], [facility]   Phone [phone]   URINE RT REFLEX TO CULTURE - Abnormal; Notable for the following components:    Blood, Urine LARGE (*)     All other components within normal limits    Narrative:     Performed at:  [facility] [address],  [employer], [facility]   Phone [phone]   BLOOD OCCULT STOOL DIAGNOSTIC - Abnormal; Notable for the following components:    Occult Blood Diagnostic   (*)     Value: Result: POSITIVE  Normal range: Negative      All other components within normal limits    Narrative:     ORDER#: [id number]                          ORDERED BY: MICHELLE HALL  SOURCE: Stool                              COLLECTED:  09/09/21 10:13  ANTIBIOTICS AT MADAI.:                      RECEIVED :  09/09/21 10:22  Performed at:  [facility] [address]  [place], [place] [employer]   Phone [phone]   MICROSCOPIC URINALYSIS - Abnormal; Notable for the following components:    Mucus, UA 1+ (*)     RBC, UA 5-10 (*)     All other components within normal limits    Narrative:     Performed at:  [facility] [address],  [place], [facility]   Phone [phone]   CBC WITH AUTO DIFFERENTIAL    Narrative:     Performed at:  [facility] [address],  [place], [facility]   Phone [phone]   LIPASE    Narrative:     Performed at:  [place] [address],  [place], [place] [facility] [place] [facility]   Phone [phone]   HCG, SERUM, QUALITATIVE    Narrative:     Performed at:  [facility] [address],  [place], [place] [facility]   Phone [phone]     Results for orders placed or performed during the hospital encounter of 09/09/21   CBC Auto Differential   Result Value Ref Range    WBC 5.8 4.0 - 11.0 K/uL    RBC 5.02 4.00 - 5.20 M/uL    Hemoglobin 14.9 12.0 - 16.0 g/dL    Hematocrit 43.5 36.0 - 48.0 %    MCV 86.5 80.0 - 100.0 fL    MCH 29.7 26.0 - 34.0 pg    MCHC 34.4 31.0 - 36.0 g/dL    RDW 13.6 12.4 - 15.4 %    Platelets 264 417 - 772 K/uL    MPV 7.4 5.0 - 10.5 fL    Neutrophils % 54.0 %    Lymphocytes % 37.2 %    Monocytes % 7.2 %    Eosinophils % 0.8 %    Basophils % 0.8 %    Neutrophils Absolute 3.1 1.7 - 7.7 K/uL    Lymphocytes Absolute 2.2 1.0 - 5.1 K/uL    Monocytes Absolute 0.4 0.0 - 1.3 K/uL    Eosinophils Absolute 0.0 0.0 - 0.6 K/uL    Basophils Absolute 0.0 0.0 - 0.2 K/uL   Comprehensive Metabolic Panel w/ Reflex to MG   Result Value Ref Range    Sodium 141 136 - 145 mmol/L    Potassium reflex Magnesium 4.1 3.5 - 5.1 mmol/L    Chloride 106 99 - 110 mmol/L    CO2 20 (L) 21 - 32 mmol/L    Anion Gap 15 3 - 16    Glucose 96 70 - 99 mg/dL    BUN 9 7 - 20 mg/dL    CREATININE 0.7 0.6 - 1.1 mg/dL GFR Non-African American >60 >60    GFR African American >60 >60    Calcium 9.3 8.3 - 10.6 mg/dL    Total Protein 7.8 6.4 - 8.2 g/dL    Albumin 4.8 3.4 - 5.0 g/dL    Albumin/Globulin Ratio 1.6 1.1 - 2.2    Total Bilirubin 0.6 0.0 - 1.0 mg/dL    Alkaline Phosphatase 74 40 - 129 U/L    ALT 43 (H) 10 - 40 U/L    AST 25 15 - 37 U/L    Globulin 3.0 g/dL   Lipase   Result Value Ref Range    Lipase 28.0 13.0 - 60.0 U/L   HCG Qualitative, Serum   Result Value Ref Range    hCG Qual Negative Detects HCG level >10 MIU/mL   Urinalysis Reflex to Culture    Specimen: Urine, clean catch   Result Value Ref Range    Color, UA Yellow Straw/Yellow    Clarity, UA Clear Clear    Glucose, Ur Negative Negative mg/dL    Bilirubin Urine Negative Negative    Ketones, Urine Negative Negative mg/dL    Specific Gravity, UA >=1.030 1.005 - 1.030    Blood, Urine LARGE (A) Negative    pH, UA 6.0 5.0 - 8.0    Protein, UA Negative Negative mg/dL    Urobilinogen, Urine 0.2 <2.0 E.U./dL    Nitrite, Urine Negative Negative    Leukocyte Esterase, Urine Negative Negative    Microscopic Examination YES     Urine Type NotGiven     Urine Reflex to Culture Not Indicated    Blood occult stool #1   Result Value Ref Range    Occult Blood Diagnostic Result: POSITIVE  Normal range: Negative   (A)    Microscopic Urinalysis   Result Value Ref Range    Mucus, UA 1+ (A) None Seen /LPF    WBC, UA 3-5 0 - 5 /HPF    RBC, UA 5-10 (A) 0 - 4 /HPF    Epithelial Cells, UA 2-5 0 - 5 /HPF       All other labs were within normal range or not returned as of this dictation. EKG: All EKG's are interpreted by the Emergency Department Physician in the absence of a cardiologist.  Please see their note for interpretation of EKG.       RADIOLOGY:   Non-plain film images such as CT, Ultrasound and MRI are read by the radiologist. Plain radiographic images are visualized andpreliminarily interpreted by the  ED Provider with the below findings:        Interpretation perthe Radiologist below, if available at the time of this note:    XR CHEST PORTABLE   Final Result   No acute process. XR CHEST PORTABLE    Result Date: 9/9/2021  EXAMINATION: ONE XRAY VIEW OF THE CHEST 9/9/2021 10:26 am COMPARISON: None. HISTORY: ORDERING SYSTEM PROVIDED HISTORY: covid +, epigastric abd pain r/o free air TECHNOLOGIST PROVIDED HISTORY: Reason for exam:->covid +, epigastric abd pain r/o free air Reason for Exam: Covid +, epigastric pain Acuity: Acute Type of Exam: Initial FINDINGS: The lungs are without acute focal process. There is no effusion or pneumothorax. The cardiomediastinal silhouette is without acute process. The osseous structures are without acute process. No acute process. PROCEDURES   Unless otherwise noted below, none     Procedures    CRITICAL CARE TIME   Critical care provided for this patient of which 0 min were spend on critical care and decision making. 0 min spent on procedures. There was imminent failure of an organ system which required critical intervention to prevent clinically significant progression of life threatening deterioration of the patient's condition to the point of disability or death. CONSULTS:  None      EMERGENCY DEPARTMENT COURSE and DIFFERENTIAL DIAGNOSIS/MDM:   Vitals:    Vitals:    09/09/21 1131   BP: 118/78   Pulse: 71   Resp: 18   Temp: 98 °F (36.7 °C)   SpO2: 99%       Patient was given thefollowing medications:  Medications   0.9 % sodium chloride bolus (0 mLs IntraVENous Stopped 9/9/21 1149)   ondansetron (ZOFRAN) injection 4 mg (4 mg IntraVENous Given 9/9/21 1104)   pantoprazole (PROTONIX) injection 40 mg (40 mg IntraVENous Given 9/9/21 1149)           ED course  Patient presented to the ER for evaluation of vomiting and epigastric abdominal pain. History of GERD. Has had burning in epigastrium and intermittent vomiting in the mornings for months. Recent COVID-19 infection.   The past 3 days vomited 6 times and saw all flecks of blood and states 1 day last week she had black stool after she drank a Slim fast unsure if it was related. Denies any further black or bloody stools. Hemoccult stool positive for blood here, possible contamination she is currently on her menses. Labs checked. Normal H&H. Electrolytes unremarkable. Chest x-ray unremarkable, no free air. Lipase is normal.  ALT is 43 otherwise LFTs unremarkable. Patient denies abdominal pain here abdomen soft and nontender on exam.  At this time I think it is safe for discharge home I will switch her from Nexium to Protonix and Carafate and refer her to GI for further evaluation. I discussed strict return precautions returning for worsening pain, worsening bleeding, intractable vomiting or fevers. She understands and agrees. I estimate there is LOW risk for ACUTE APPENDICITIS, BOWEL OBSTRUCTION, CHOLECYSTITIS, DIVERTICULITIS, INCARCERATED HERNIA, PANCREATITIS, PERFORATED BOWEL, BOWEL ISCHEMIA, GONADAL TORSION thus I consider the discharge disposition reasonable. Also, there is no evidence or peritonitis, sepsis, or toxicity. FINAL IMPRESSION      1. Abdominal pain, epigastric    2. Non-intractable vomiting with nausea, unspecified vomiting type    3.  Heme positive stool          DISPOSITION/PLAN   DISPOSITION Decision to Discharge    PATIENT REFERREDTO:  Marivel Abdalla MD  [address] [facility] [phone]    Call in 1 day  GI specialist, call for follow-up appointment from ER visit    [place] [facility]  [address] [facility]  [phone]    If symptoms worsen      DISCHARGE MEDICATIONS:  New Prescriptions    ONDANSETRON (ZOFRAN) 4 MG TABLET    Take 1 tablet by mouth every 8 hours as needed for Nausea    PANTOPRAZOLE (PROTONIX) 40 MG TABLET    Take 1 tablet by mouth every morning (before breakfast)    SUCRALFATE (CARAFATE) 1 GM TABLET    Take 1 tablet by mouth 4 times daily for 14 days DISCONTINUED MEDICATIONS:  Discontinued Medications    No medications on file              (Please note that portions ofthis note were completed with a voice recognition program.  Efforts were made to edit the dictations but occasionally words are mis-transcribed.)    Zainab Toledo PA-C (electronically signed)            Lina Parker PA-C  09/09/21 4119

## 2022-12-11 ENCOUNTER — HOSPITAL ENCOUNTER (EMERGENCY)
Age: 32
Discharge: HOME | End: 2022-12-11
Payer: COMMERCIAL

## 2022-12-11 ENCOUNTER — APPOINTMENT (OUTPATIENT)
Dept: GENERAL RADIOLOGY | Age: 32
End: 2022-12-11
Payer: COMMERCIAL

## 2022-12-11 VITALS
HEIGHT: 62 IN | OXYGEN SATURATION: 99 % | TEMPERATURE: 98.3 F | SYSTOLIC BLOOD PRESSURE: 173 MMHG | BODY MASS INDEX: 40.48 KG/M2 | DIASTOLIC BLOOD PRESSURE: 88 MMHG | RESPIRATION RATE: 18 BRPM | WEIGHT: 220 LBS | HEART RATE: 88 BPM

## 2022-12-11 DIAGNOSIS — R07.9 CHEST PAIN, UNSPECIFIED TYPE: Primary | ICD-10-CM

## 2022-12-11 DIAGNOSIS — J11.1 INFLUENZA: ICD-10-CM

## 2022-12-11 LAB
RAPID INFLUENZA  B AGN: NEGATIVE
RAPID INFLUENZA A AGN: POSITIVE
SARS-COV-2, NAAT: NOT DETECTED
TROPONIN: <0.01 NG/ML

## 2022-12-11 PROCEDURE — 71046 X-RAY EXAM CHEST 2 VIEWS: CPT

## 2022-12-11 PROCEDURE — 36415 COLL VENOUS BLD VENIPUNCTURE: CPT

## 2022-12-11 PROCEDURE — 87804 INFLUENZA ASSAY W/OPTIC: CPT

## 2022-12-11 PROCEDURE — 99285 EMERGENCY DEPT VISIT HI MDM: CPT

## 2022-12-11 PROCEDURE — 87635 SARS-COV-2 COVID-19 AMP PRB: CPT

## 2022-12-11 PROCEDURE — 84484 ASSAY OF TROPONIN QUANT: CPT

## 2022-12-11 PROCEDURE — 93005 ELECTROCARDIOGRAM TRACING: CPT | Performed by: EMERGENCY MEDICINE

## 2022-12-11 RX ORDER — OMEPRAZOLE 20 MG/1
20 CAPSULE, DELAYED RELEASE ORAL
COMMUNITY

## 2022-12-11 ASSESSMENT — ENCOUNTER SYMPTOMS
SORE THROAT: 0
CONSTIPATION: 0
SINUS PRESSURE: 1
ABDOMINAL PAIN: 0
SINUS PAIN: 1
SHORTNESS OF BREATH: 0
RHINORRHEA: 0
COUGH: 0
DIARRHEA: 0
BACK PAIN: 0
EYE PAIN: 0
NAUSEA: 0
VOMITING: 0

## 2022-12-11 ASSESSMENT — PAIN SCALES - GENERAL: PAINLEVEL_OUTOF10: 1

## 2022-12-11 ASSESSMENT — PAIN DESCRIPTION - LOCATION: LOCATION: CHEST

## 2022-12-11 NOTE — ED PROVIDER NOTES
[address] [facility]        Pt Name: Fady Tejada  MRN: [id number]  [place] 1990  Date of evaluation: 12/11/2022  Provider: JEREMY Davidson  PCP: Mk Cuevas MD  Note Started: 11:53 AM EST 12/11/22          NORIS. I have evaluated this patient. My supervising physician was available for consultation. CHIEF COMPLAINT       Chief Complaint   Patient presents with    Chest Pain     Presents with chest pain for the last 3 days. Had a virtual visit and was told to come to ER. HISTORY OF PRESENT ILLNESS   (Location, Timing/Onset, Context/Setting, Quality, Duration, Modifying Factors, Severity, Associated Signs and Symptoms)  Note limiting factors. Chief Complaint: Chest pain for 1 week    Fady Tejada is a 27 y.o. female who presents to the emergency room due to chest pain for the past week. Patient states that she saw her primary care doctor via virtual encounter and they told her to come to the emergency department to have further evaluation of her chest pain. Patient states that she is also been developing sinus congestion and wanted get tested for COVID and influenza test today. Patient denies any calf pain, lower leg swelling or history of DVT or PE. Patient denies any cardiac history. Patient denies any shortness of breath or difficulty breathing. Patient states that her chest pain is improved today compared to the last several days. Patient has been taking Flonase for her sinus congestion with little to no relief. Patient has any fevers or chills. Nursing Notes were all reviewed and agreed with or any disagreements were addressed in the HPI. REVIEW OF SYSTEMS    (2-9 systems for level 4, 10 or more for level 5)     Review of Systems   Constitutional:  Negative for chills, diaphoresis and fever. HENT:  Positive for sinus pressure and sinus pain. Negative for congestion, rhinorrhea and sore throat.     Eyes: Negative for pain and visual disturbance. Respiratory:  Negative for cough and shortness of breath. Cardiovascular:  Positive for chest pain. Negative for leg swelling. Gastrointestinal:  Negative for abdominal pain, constipation, diarrhea, nausea and vomiting. Genitourinary:  Negative for difficulty urinating, dysuria and frequency. Musculoskeletal:  Negative for back pain and neck pain. Skin:  Negative for rash and wound. Neurological:  Negative for dizziness and light-headedness. Positives and Pertinent negatives as per HPI. Except as noted above in the ROS, all other systems were reviewed and negative. PAST MEDICAL HISTORY     Past Medical History:   Diagnosis Date    GERD (gastroesophageal reflux disease)          SURGICAL HISTORY     Past Surgical History:   Procedure Laterality Date    FINGER SURGERY Left 2007         CURRENTMEDICATIONS       Previous Medications    CETIRIZINE (ZYRTEC) 10 MG TABLET    Take 10 mg by mouth daily    OMEPRAZOLE (PRILOSEC) 20 MG DELAYED RELEASE CAPSULE    Take 20 mg by mouth every morning (before breakfast)    ONDANSETRON (ZOFRAN) 4 MG TABLET    Take 1 tablet by mouth every 8 hours as needed for Nausea    PANTOPRAZOLE (PROTONIX) 40 MG TABLET    Take 1 tablet by mouth every morning (before breakfast)    PREDNISONE (DELTASONE) 20 MG TABLET    Take 3 tabs orally daily for 3 days, then take 2 tabs orally for 3 days then take 1 tab orally for 3 days. SUCRALFATE (CARAFATE) 1 GM TABLET    Take 1 tablet by mouth 4 times daily for 14 days         ALLERGIES     Patient has no known allergies.     FAMILYHISTORY       Family History   Problem Relation Age of Onset    Cancer Maternal Grandmother         pancreatic    Breast Cancer Paternal Grandmother           SOCIAL HISTORY       Social History     Tobacco Use    Smoking status: Never    Smokeless tobacco: Never   Vaping Use    Vaping Use: Never used   Substance Use Topics    Alcohol use: Yes     Comment: occassionally    Drug use: Yes     Types: Marijuana (Weed)       SCREENINGS    [place] Coma Scale  Eye Opening: Spontaneous  Best Verbal Response: Oriented  Best Motor Response: Obeys commands  [place] Coma Scale Score: 15        PHYSICAL EXAM    (up to 7 for level 4, 8 or more for level 5)     ED Triage Vitals [12/11/22 1130]   BP Temp Temp Source Heart Rate Resp SpO2 Height Weight   (!) 173/88 98.3 °F (36.8 °C) Oral 88 18 99 % 5' 2\" (1.575 m) 220 lb (99.8 kg)       Physical Exam  Vitals and nursing note reviewed. Constitutional:       General: She is not in acute distress. Appearance: She is normal weight. She is not ill-appearing. HENT:      Head: Normocephalic. Nose: Rhinorrhea present. Mouth/Throat:      Mouth: Mucous membranes are moist.      Pharynx: Oropharynx is clear. No oropharyngeal exudate or posterior oropharyngeal erythema. Cardiovascular:      Rate and Rhythm: Normal rate and regular rhythm. Pulses: Normal pulses. Heart sounds: Normal heart sounds. Pulmonary:      Effort: Pulmonary effort is normal.      Breath sounds: Normal breath sounds. Abdominal:      General: Bowel sounds are normal. There is no distension. Palpations: There is no mass. Tenderness: There is no abdominal tenderness. Musculoskeletal:      Cervical back: Normal range of motion and neck supple. Right lower leg: No edema. Left lower leg: No edema. Skin:     Findings: No lesion or rash. Neurological:      Mental Status: She is alert and oriented to person, place, and time. Psychiatric:         Mood and Affect: Mood normal.         Behavior: Behavior normal.       DIAGNOSTIC RESULTS   LABS:    Labs Reviewed   RAPID INFLUENZA A/B ANTIGENS - Abnormal; Notable for the following components:       Result Value    Rapid Influenza A Ag POSITIVE (*)     All other components within normal limits   COVID-19, RAPID   TROPONIN       When ordered only abnormal lab results are displayed. All other labs were within normal range or not returned as of this dictation. EKG: When ordered, EKG's are interpreted by the Emergency Department Physician in the absence of a cardiologist.  Please see their note for interpretation of EKG. RADIOLOGY:   Non-plain film images such as CT, Ultrasound and MRI are read by the radiologist. Plain radiographic images are visualized and preliminarily interpreted by the ED Provider with the below findings:        Interpretation per the Radiologist below, if available at the time of this note:    XR CHEST (2 VW)   Final Result   No acute process. No results found. PROCEDURES   Unless otherwise noted below, none     Procedures    CRITICAL CARE TIME       CONSULTS:  None      EMERGENCY DEPARTMENT COURSE and DIFFERENTIAL DIAGNOSIS/MDM:   Vitals:    Vitals:    12/11/22 1130   BP: (!) 173/88   Pulse: 88   Resp: 18   Temp: 98.3 °F (36.8 °C)   TempSrc: Oral   SpO2: 99%   Weight: 220 lb (99.8 kg)   Height: 5' 2\" (1.575 m)       Patient was given the following medications:  Medications - No data to display      Is this patient to be included in the SEP-1 Core Measure due to severe sepsis or septic shock? No   Exclusion criteria - the patient is NOT to be included for SEP-1 Core Measure due to:  Viral etiology found or highly suspected (including COVID-19) without concomitant bacterial infection    43-year-old female presents emergency room due to chest pain for the past 3 days she states that she had a virtual visit today and told to come to emergency department for further evaluation for this chest pain. She is also been complaining of sinus congestion for the last 24 hours. Been using Flonase for this. Patient denies any shortness of breath or difficulty breathing. On exam patient otherwise appears well lungs are clear heart has regular rate and rhythm. Bilateral radial pulses equal intact 2+. Patient does not have any history of cardiac issues. Bilateral lower legs are normal without any swelling or tenderness. Low suspicion for PE or DVT. Influenza testing was ordered today and was positive. Rest of patient's work-up was unremarkable including a negative troponin. EKG was normal sinus rhythm and chest x-ray did not show any acute abnormalities. Patient be discharged to take over-the-counter medications to help with her flulike symptoms and to follow-up with her primary care doctor next week for recheck. At this time I have low suspicion for pneumonia, pertussis, hemothorax, pneumothorax, pericarditis, myocarditis, tension pneumothorax, influenza, COVID-19, pulmonary embolism, Aortic dissection, AAA or other acute emergency pathologies. FINAL IMPRESSION      1. Chest pain, unspecified type    2.  Influenza          DISPOSITION/PLAN   DISPOSITION Decision To Discharge 12/11/2022 12:49:25 PM      PATIENT REFERRED TO:  Yoan Molina MD  [address] [place] [address] Dr Karsten Rose Kevin  [phone]    Schedule an appointment as soon as possible for a visit in 1 week  recheck    [facility] Emergency Department  [address] [place] [address]  [phone]    As needed, If symptoms worsen    DISCHARGE MEDICATIONS:  New Prescriptions    No medications on file       DISCONTINUED MEDICATIONS:  Discontinued Medications    No medications on file              (Please note that portions of this note were completed with a voice recognition program.  Efforts were made to edit the dictations but occasionally words are mis-transcribed.)    Adelene Riedel, PA (electronically signed)            Adelene Riedel, PA  12/11/22 3519

## 2022-12-11 NOTE — Clinical Note
Betzy Gutiérrez was seen and treated in our emergency department on 12/11/2022. She may return to work on 12/12/2022. If you have any questions or concerns, please don't hesitate to call.       JEREMY Heath

## 2022-12-11 NOTE — LETTER
[facility]  [address] [place] [address], [address]             December 11, 2022    Patient: Archana Rogers   YOB: 1990   Date of Visit: 12/11/2022       To Whom It May Concern:    Archana Rogers was seen and treated in our emergency department on 12/11/2022. She may return to work on 12/14/2022.       Sincerely,         [facility]
n/a

## 2022-12-11 NOTE — DISCHARGE INSTRUCTIONS
Continue take over-the-counter medications to help with symptom relief    Follow-up with primary care doctor next week for recheck    Return to emergency room if any shortness of breath, difficulty breathing or chest pain.

## 2022-12-12 LAB
EKG ATRIAL RATE: 73 BPM
EKG DIAGNOSIS: NORMAL
EKG P AXIS: 42 DEGREES
EKG P-R INTERVAL: 168 MS
EKG Q-T INTERVAL: 400 MS
EKG QRS DURATION: 78 MS
EKG QTC CALCULATION (BAZETT): 440 MS
EKG R AXIS: 38 DEGREES
EKG T AXIS: 45 DEGREES
EKG VENTRICULAR RATE: 73 BPM

## 2022-12-12 PROCEDURE — 93010 ELECTROCARDIOGRAM REPORT: CPT | Performed by: INTERNAL MEDICINE

## 2023-04-22 SDOH — HEALTH STABILITY: PHYSICAL HEALTH: ON AVERAGE, HOW MANY DAYS PER WEEK DO YOU ENGAGE IN MODERATE TO STRENUOUS EXERCISE (LIKE A BRISK WALK)?: 0 DAYS

## 2023-04-22 SDOH — HEALTH STABILITY: PHYSICAL HEALTH: ON AVERAGE, HOW MANY MINUTES DO YOU ENGAGE IN EXERCISE AT THIS LEVEL?: 0 MIN

## 2023-04-22 ASSESSMENT — SOCIAL DETERMINANTS OF HEALTH (SDOH)
WITHIN THE LAST YEAR, HAVE YOU BEEN KICKED, HIT, SLAPPED, OR OTHERWISE PHYSICALLY HURT BY YOUR PARTNER OR EX-PARTNER?: NO
WITHIN THE LAST YEAR, HAVE YOU BEEN AFRAID OF YOUR PARTNER OR EX-PARTNER?: NO
WITHIN THE LAST YEAR, HAVE YOU BEEN HUMILIATED OR EMOTIONALLY ABUSED IN OTHER WAYS BY YOUR PARTNER OR EX-PARTNER?: NO
WITHIN THE LAST YEAR, HAVE TO BEEN RAPED OR FORCED TO HAVE ANY KIND OF SEXUAL ACTIVITY BY YOUR PARTNER OR EX-PARTNER?: NO

## 2023-04-25 ENCOUNTER — OFFICE VISIT (OUTPATIENT)
Dept: INTERNAL MEDICINE CLINIC | Age: 33
End: 2023-04-25
Payer: COMMERCIAL

## 2023-04-25 VITALS
SYSTOLIC BLOOD PRESSURE: 128 MMHG | BODY MASS INDEX: 41.99 KG/M2 | HEIGHT: 62 IN | OXYGEN SATURATION: 96 % | WEIGHT: 228.2 LBS | DIASTOLIC BLOOD PRESSURE: 82 MMHG | HEART RATE: 93 BPM

## 2023-04-25 DIAGNOSIS — Z00.00 ANNUAL PHYSICAL EXAM: ICD-10-CM

## 2023-04-25 DIAGNOSIS — Z79.899 MEDICAL MARIJUANA USE: ICD-10-CM

## 2023-04-25 DIAGNOSIS — F43.23 ADJUSTMENT REACTION WITH ANXIETY AND DEPRESSION: ICD-10-CM

## 2023-04-25 DIAGNOSIS — K21.9 GASTROESOPHAGEAL REFLUX DISEASE WITHOUT ESOPHAGITIS: ICD-10-CM

## 2023-04-25 DIAGNOSIS — Z91.89 AT RISK FOR SLEEP APNEA: ICD-10-CM

## 2023-04-25 DIAGNOSIS — G89.29 CHRONIC NECK PAIN: Primary | ICD-10-CM

## 2023-04-25 DIAGNOSIS — N62 LARGE BREASTS: ICD-10-CM

## 2023-04-25 DIAGNOSIS — M54.9 CHRONIC UPPER BACK PAIN: ICD-10-CM

## 2023-04-25 DIAGNOSIS — M54.2 CHRONIC NECK PAIN: Primary | ICD-10-CM

## 2023-04-25 DIAGNOSIS — F10.90 HEAVY ALCOHOL CONSUMPTION: ICD-10-CM

## 2023-04-25 DIAGNOSIS — G89.29 CHRONIC UPPER BACK PAIN: ICD-10-CM

## 2023-04-25 DIAGNOSIS — J30.89 ENVIRONMENTAL AND SEASONAL ALLERGIES: ICD-10-CM

## 2023-04-25 PROCEDURE — 99204 OFFICE O/P NEW MOD 45 MIN: CPT | Performed by: INTERNAL MEDICINE

## 2023-04-25 PROCEDURE — G8427 DOCREV CUR MEDS BY ELIG CLIN: HCPCS | Performed by: INTERNAL MEDICINE

## 2023-04-25 PROCEDURE — G8417 CALC BMI ABV UP PARAM F/U: HCPCS | Performed by: INTERNAL MEDICINE

## 2023-04-25 PROCEDURE — 1036F TOBACCO NON-USER: CPT | Performed by: INTERNAL MEDICINE

## 2023-04-25 SDOH — ECONOMIC STABILITY: FOOD INSECURITY: WITHIN THE PAST 12 MONTHS, THE FOOD YOU BOUGHT JUST DIDN'T LAST AND YOU DIDN'T HAVE MONEY TO GET MORE.: NEVER TRUE

## 2023-04-25 SDOH — ECONOMIC STABILITY: FOOD INSECURITY: WITHIN THE PAST 12 MONTHS, YOU WORRIED THAT YOUR FOOD WOULD RUN OUT BEFORE YOU GOT MONEY TO BUY MORE.: NEVER TRUE

## 2023-04-25 SDOH — ECONOMIC STABILITY: INCOME INSECURITY: HOW HARD IS IT FOR YOU TO PAY FOR THE VERY BASICS LIKE FOOD, HOUSING, MEDICAL CARE, AND HEATING?: NOT VERY HARD

## 2023-04-25 SDOH — ECONOMIC STABILITY: HOUSING INSECURITY
IN THE LAST 12 MONTHS, WAS THERE A TIME WHEN YOU DID NOT HAVE A STEADY PLACE TO SLEEP OR SLEPT IN A SHELTER (INCLUDING NOW)?: NO

## 2023-04-25 ASSESSMENT — PATIENT HEALTH QUESTIONNAIRE - PHQ9
SUM OF ALL RESPONSES TO PHQ QUESTIONS 1-9: 2
2. FEELING DOWN, DEPRESSED OR HOPELESS: 1
SUM OF ALL RESPONSES TO PHQ9 QUESTIONS 1 & 2: 2
2. FEELING DOWN, DEPRESSED OR HOPELESS: 1
1. LITTLE INTEREST OR PLEASURE IN DOING THINGS: 1
SUM OF ALL RESPONSES TO PHQ QUESTIONS 1-9: 2
SUM OF ALL RESPONSES TO PHQ QUESTIONS 1-9: 2
1. LITTLE INTEREST OR PLEASURE IN DOING THINGS: 1
SUM OF ALL RESPONSES TO PHQ9 QUESTIONS 1 & 2: 2
SUM OF ALL RESPONSES TO PHQ QUESTIONS 1-9: 2

## 2023-04-25 ASSESSMENT — ENCOUNTER SYMPTOMS
VOMITING: 0
CHEST TIGHTNESS: 0
CONSTIPATION: 0
ABDOMINAL PAIN: 0
NAUSEA: 0
COLOR CHANGE: 0
SHORTNESS OF BREATH: 0
SINUS PAIN: 0
COUGH: 0
SORE THROAT: 0
WHEEZING: 0
BACK PAIN: 1

## 2023-04-25 NOTE — ASSESSMENT & PLAN NOTE
Refer to physical therapy for evaluation and treatment, referral to plastic surgery to evaluate for breast reduction surgery, can continue as needed Tylenol and/or NSAIDs

## 2023-04-25 NOTE — ASSESSMENT & PLAN NOTE
No red flags or alarming symptoms when it comes to depression, symptoms appear to be mostly stress and anxiety related, patient is interested in CBT, will refer to behavioral health for counseling

## 2023-04-25 NOTE — ASSESSMENT & PLAN NOTE
Referred to plastic surgery to evaluate for breast reduction as this may be contributing to her chronic back and neck pain

## 2023-04-25 NOTE — ASSESSMENT & PLAN NOTE
Counseled regarding need to cut back on total calorie intake, adhere to low-carb low-fat diet, discontinue alcohol consumption and maintain daily regular level of physical activity along with 20 to 30 minutes of cardio at least 3 times a week

## 2023-04-25 NOTE — ASSESSMENT & PLAN NOTE
Patient with poor quality nonrestful sleep, loud snoring, obesity.   Advised will need to be evaluated for underlying sleep apnea, encouraged attempts for weight loss

## 2023-04-25 NOTE — ASSESSMENT & PLAN NOTE
Taking over-the-counter omeprazole with adequate relief, reinforced recommendations for antireflux diet, need for weight loss and abstinence from alcohol explained to patient as other factors to help with controlling acid reflux

## 2023-04-25 NOTE — PROGRESS NOTES
ASSESSMENT/PLAN:  1. Chronic neck pain  Assessment & Plan:   Refer to physical therapy for evaluation and treatment, referral to plastic surgery to evaluate for breast reduction surgery, can continue as needed Tylenol and/or NSAIDs  Orders:  -     [facility]  2. Chronic upper back pain  Assessment & Plan:  As noted above  Orders:  -     [facility]  3. Large breasts  Assessment & Plan:   Referred to plastic surgery to evaluate for breast reduction as this may be contributing to her chronic back and neck pain  Orders:  -     [address] [facility]  4. Gastroesophageal reflux disease without esophagitis  Assessment & Plan:   Taking over-the-counter omeprazole with adequate relief, reinforced recommendations for antireflux diet, need for weight loss and abstinence from alcohol explained to patient as other factors to help with controlling acid reflux  5. Medical marijuana use  6. Heavy alcohol consumption  7. Environmental and seasonal allergies  Assessment & Plan:   Stable and well-controlled on daily Zyrtec and as needed Astelin nasal spray, continue same, continue to avoid smoke and known irritants  8. At risk for sleep apnea  Assessment & Plan:  Patient with poor quality nonrestful sleep, loud snoring, obesity. Advised will need to be evaluated for underlying sleep apnea, encouraged attempts for weight loss  Orders:  -     [address] MD, Sleep Medicine, [facility]  9. BMI 40.0-44.9, adult [facility])  Assessment & Plan:  Counseled regarding need to cut back on total calorie intake, adhere to low-carb low-fat diet, discontinue alcohol consumption and maintain daily regular level of physical activity along with 20 to 30 minutes of cardio at least 3 times a week  10.  Adjustment reaction with anxiety and

## 2023-05-15 ENCOUNTER — HOSPITAL ENCOUNTER (OUTPATIENT)
Dept: PHYSICAL THERAPY | Age: 33
Setting detail: THERAPIES SERIES
Discharge: HOME | End: 2023-05-15
Payer: COMMERCIAL

## 2023-05-15 PROCEDURE — 97110 THERAPEUTIC EXERCISES: CPT

## 2023-05-15 PROCEDURE — 97161 PT EVAL LOW COMPLEX 20 MIN: CPT

## 2023-05-15 NOTE — PLAN OF CARE
[address] [address], [address] [place] [address] Drive  Phone: [phone]   Fax:     [phone]                                                       Physical Therapy Certification    Dear Danielle Paez MD  ,    We had the pleasure of evaluating the following patient for physical therapy services at [address] [place] [address]. A summary of our findings can be found in the initial assessment below. This includes our plan of care. If you have any questions or concerns regarding these findings, please do not hesitate to contact me at the office phone number checked above. Thank you for the referral.       Physician Signature:_______________________________Date:__________________  By signing above (or electronic signature), therapists plan is approved by physician      Patient: Archana Rogers   : 1990   MRN: [id number]  Referring Physician: Danielle Paez MD        Evaluation Date: 5/15/2023      Medical Diagnosis Information:  Chronic neck pain [M54.2, G89.29]  Chronic upper back pain [M54.9, G89.29]   PT diagnosis: decreased flexibility, tender to palpation, pain, and poor posture associated with chronic cervical and thoracic pain                                         Insurance information:  [facility] ($15 copay, 20/year, no auth)    Precautions/ Contra-indications: n/a  Latex Allergy:  [x]NO      []YES  Preferred Language for Healthcare:   [x]English       []Other:    C-SSRS Triggered by Intake questionnaire (Past 2 wk assessment ):   [x] No, Questionnaire did not trigger screening.   [] Yes, Patient intake triggered C-SSRS Screening     [] Completed, no further action required. [] Completed, PCP notified via Epic    SUBJECTIVE: Patient stated complaint: Pt states that she has been having neck and upper back pain intermittently over the past ~15 years, but has progressed more over the past few years.  Pt states that she saw MD last month

## 2023-05-15 NOTE — FLOWSHEET NOTE
[address]  Phone: [phone]   Fax: [phone]    Physical Therapy Daily Treatment Note    Date:  05/15/2023     Patient Name:  Yoseph Rossi    :  1990  MRN: [id number]  Medical Diagnosis:  Chronic neck pain [M54.2, G89.29]  Chronic upper back pain [M54.9, G89.29]  Treatment Diagnosis: decreased flexibility, tender to palpation, pain, and poor posture associated with chronic cervical and thoracic pain  Insurance/Certification information:   [facility] ($15 copay, 20/year, no auth)  Physician Information:  Phuong Madden MD    Plan of care signed (Y/N): []  Yes [x]  No     Date of Patient follow up with Physician:      Progress Report: []  Yes  [x]  No     Date Range for reporting period:  Beginnin/15/2023  Ending:     Progress report due (10 Rx/or 30 days whichever is less): visit #10 or 66 (date)     Recertification due (POC duration/ or 90 days whichever is less): visit #20 or 23 (date)     Visit # Insurance Allowable Auth required? Date Range   1 20 []  Yes  [x]  No -       Units approved Units used Date Range   - - -     Latex Allergy:  [x]NO      []YES  Preferred Language for Healthcare:   [x]English       []other:    Functional Scale:       Date assessed:  NDI: raw score = 9; dysfunction = 18%  5/15/23    Pain level:  0/10     SUBJECTIVE:  See eval    OBJECTIVE: See eval  Observation:   Test measurements:      RESTRICTIONS/PRECAUTIONS: n/a    Exercises/Interventions:   Therapeutic Exercise (19001) Resistance / level Sets/sec Reps Notes   HEP provided to patient with patient education on proper for and execution of exercise.   10'     Shoulder retractions       Cervical extension SNAG with towel       UT/levator stretch       CC rows NV      Horizontal abduction NV      Wall clock NV      Bilateral ER with scap squeeze NV      Bilateral shoulder extension with scap squeeze                     Therapeutic Activities (11221)

## 2023-05-18 ENCOUNTER — HOSPITAL ENCOUNTER (OUTPATIENT)
Dept: PHYSICAL THERAPY | Age: 33
Setting detail: THERAPIES SERIES
Discharge: HOME | End: 2023-05-18
Payer: COMMERCIAL

## 2023-05-18 PROCEDURE — 97140 MANUAL THERAPY 1/> REGIONS: CPT

## 2023-05-18 PROCEDURE — 97110 THERAPEUTIC EXERCISES: CPT

## 2023-05-18 NOTE — FLOWSHEET NOTE
[address]  Phone: [phone]   Fax: [phone]    Physical Therapy Daily Treatment Note    Date:  2023     Patient Name:  Willy Mederos    :  1990  MRN: [id number]  Medical Diagnosis:  Chronic neck pain [M54.2, G89.29]  Chronic upper back pain [M54.9, G89.29]  Treatment Diagnosis: decreased flexibility, tender to palpation, pain, and poor posture associated with chronic cervical and thoracic pain  Insurance/Certification information:   [facility] ($15 copay, 20/year, no auth)  Physician Information:  Jess Carroll MD    Plan of care signed (Y/N): []  Yes [x]  No     Date of Patient follow up with Physician:      Progress Report: []  Yes  [x]  No     Date Range for reporting period:  Beginnin/15/2023  Ending:     Progress report due (10 Rx/or 30 days whichever is less): visit #10 or  (date)     Recertification due (POC duration/ or 90 days whichever is less): visit #20 or 23 (date)     Visit # Insurance Allowable Auth required? Date Range   2 20 []  Yes  [x]  No -       Units approved Units used Date Range   - - -     Latex Allergy:  [x]NO      []YES  Preferred Language for Healthcare:   [x]English       []other:    Functional Scale:       Date assessed:  NDI: raw score = 9; dysfunction = 18%  5/15/23    Pain level:  1/10     SUBJECTIVE:  Has been doing 1 stretch more than the others, wants to make goal of doing exercises nightly before bed. Feels ok today, just more achy and sore.       OBJECTIVE: See eval  Observation:   Test measurements:      RESTRICTIONS/PRECAUTIONS: n/a    Exercises/Interventions:   Therapeutic Exercise (19165) Resistance / level Sets/sec Reps Notes       UBE 2' ea direction 4'     Shoulder retractions       Cervical extension SNAG with towel       UT/levator stretch        rows lime 2 10 5/18   Horizontal abduction lime 2 10 5/18 - monitor muscle compensation   Wall clock NV      Bilateral ER with scap

## 2023-05-23 ENCOUNTER — HOSPITAL ENCOUNTER (OUTPATIENT)
Dept: PHYSICAL THERAPY | Age: 33
Setting detail: THERAPIES SERIES
Discharge: HOME | End: 2023-05-23
Payer: COMMERCIAL

## 2023-05-23 PROCEDURE — 97110 THERAPEUTIC EXERCISES: CPT

## 2023-05-23 PROCEDURE — 97140 MANUAL THERAPY 1/> REGIONS: CPT

## 2023-05-23 NOTE — FLOWSHEET NOTE
[] EVAL - LOW (39509)   [] EVAL - MOD (93823)  [] EVAL - HIGH (97688)  [] RE-EVAL (36774)  [x] TA(31396) x 2      [] Ionto  [] NMR (51444) x       [] Vaso  [x] Manual (15283) x  1     [] Ultrasound  [] TA x        [] Mech Traction (60307)  [] Aquatic Therapy x     [] ES (un) (14622):   [] Home Management Training x  [] ES(attended) (84666)   [] Dry Needling 1-2 muscles (90773):  [] Dry Needling 3+ muscles (002922  [] Group:      [] Other:     GOALS:  Patient stated goal: to reduce neck pain  [] Progressing: [] Met: [] Not Met: [] Adjusted    Therapist goals for Patient:   Short Term Goals: To be achieved in: 2 weeks  1. Independent in HEP and progression per patient tolerance, in order to prevent re-injury. [] Progressing: [] Met: [] Not Met: [] Adjusted  2. Patient will have a decrease in pain to facilitate improvement in movement, function, and ADLs as indicated by Functional Deficits. [] Progressing: [] Met: [] Not Met: [] Adjusted    Long Term Goals: To be achieved in: 10 weeks  1. Pt will improve NDI by 10 points to reduce disability and progress towards PLOF. [] Progressing: [] Met: [] Not Met: [] Adjusted  2. Patient will demonstrate increased AROM to [facility] of cervical/thoracic spine to allow for proper joint functioning as indicated by patients Functional Deficits. [] Progressing: [] Met: [] Not Met: [] Adjusted  3. Patient will demonstrate an increase in postural awareness and control and activation of  Deep cervical stabilizers to allow for proper functional mobility and to be able to drive>30 minutes with minimal-no symptoms. [] Progressing: [] Met: [] Not Met: [] Adjusted  4. Patient will return to functional activities including ambulating community distances without increased symptoms or restriction. [] Progressing: [] Met: [] Not Met: [] Adjusted  5. Pt will be able to sleep through the night.   [] Progressing: [] Met: [] Not Met: [] Adjusted     Overall Progression Towards Functional

## 2023-05-25 ENCOUNTER — HOSPITAL ENCOUNTER (OUTPATIENT)
Dept: PHYSICAL THERAPY | Age: 33
Setting detail: THERAPIES SERIES
Discharge: HOME | End: 2023-05-25
Payer: COMMERCIAL

## 2023-05-25 PROCEDURE — 97110 THERAPEUTIC EXERCISES: CPT

## 2023-05-25 PROCEDURE — 97140 MANUAL THERAPY 1/> REGIONS: CPT

## 2023-05-25 NOTE — FLOWSHEET NOTE
[address]  Phone: [phone]   Fax: [phone]    Physical Therapy Daily Treatment Note    Date:  2023     Patient Name:  Cynthia Navarrete    :  1990  MRN: [id number]  Medical Diagnosis:  Chronic neck pain [M54.2, G89.29]  Chronic upper back pain [M54.9, G89.29]  Treatment Diagnosis: decreased flexibility, tender to palpation, pain, and poor posture associated with chronic cervical and thoracic pain  Insurance/Certification information:   [facility] ($15 copay, 20/year, no auth)  Physician Information:  Alexy Du MD    Plan of care signed (Y/N): []  Yes [x]  No     Date of Patient follow up with Physician:      Progress Report: []  Yes  [x]  No     Date Range for reporting period:  Beginnin/15/2023  Ending:     Progress report due (10 Rx/or 30 days whichever is less): visit #10 or  (date)     Recertification due (POC duration/ or 90 days whichever is less): visit #20 or 23 (date)     Visit # Insurance Allowable Auth required? Date Range   4 20 []  Yes  [x]  No -       Units approved Units used Date Range   - - -     Latex Allergy:  [x]NO      []YES  Preferred Language for Healthcare:   [x]English       []other:    Functional Scale:       Date assessed:  NDI: raw score = 9; dysfunction = 18%  5/15/23    Pain level:  0/10     SUBJECTIVE:  Pt c/o 710 R UE and c/s pain today which she associates with gym workout and shooting basketball yesterday.       OBJECTIVE: See eval  Observation:   Test measurements:      RESTRICTIONS/PRECAUTIONS: n/a    Exercises/Interventions:   Therapeutic Exercise (34213) Resistance / level Sets/sec Reps Notes       UBE 2' ea direction 4'     Shoulder retractions       Cervical extension SNAG with towel       UT/levator stretch        rows lime 2 10 5/18   Horizontal abduction lime 2 10 5/18 - monitor muscle compensation   Wall clock NV      Bilateral ER with scap squeeze lime 2 10 518   Bilateral

## 2023-05-30 ENCOUNTER — HOSPITAL ENCOUNTER (OUTPATIENT)
Dept: PHYSICAL THERAPY | Age: 33
Setting detail: THERAPIES SERIES
Discharge: HOME | End: 2023-05-30
Payer: COMMERCIAL

## 2023-05-30 PROCEDURE — 97140 MANUAL THERAPY 1/> REGIONS: CPT

## 2023-05-30 PROCEDURE — 97110 THERAPEUTIC EXERCISES: CPT

## 2023-05-30 NOTE — FLOWSHEET NOTE
[facility]  Phone: [phone]   Fax: [phone]    Physical Therapy Daily Treatment Note    Date:  2023     Patient Name:  Desiree Ross    :  1990  MRN: [id number]  Medical Diagnosis:  Chronic neck pain [M54.2, G89.29]  Chronic upper back pain [M54.9, G89.29]  Treatment Diagnosis: decreased flexibility, tender to palpation, pain, and poor posture associated with chronic cervical and thoracic pain  Insurance/Certification information:   [facility] ($15 copay, 20/year, no auth)  Physician Information:  Luisa Card MD    Plan of care signed (Y/N): []  Yes [x]  No     Date of Patient follow up with Physician:      Progress Report: []  Yes  [x]  No     Date Range for reporting period:  Beginnin/15/2023  Ending:     Progress report due (10 Rx/or 30 days whichever is less): visit #10 or  (date)     Recertification due (POC duration/ or 90 days whichever is less): visit #20 or 23 (date)     Visit # Insurance Allowable Auth required? Date Range   5 20 []  Yes  [x]  No -       Units approved Units used Date Range   - - -     Latex Allergy:  [x]NO      []YES  Preferred Language for Healthcare:   [x]English       []other:    Functional Scale:       Date assessed:  NDI: raw score = 9; dysfunction = 18%  5/15/23    Pain level:  0/10     SUBJECTIVE:  Pt reports no pain today. She states that when she leaves PT, she feels good, but when doing ADLs such as cleaning the house or when she is playing basketball, the pain comes back but it is tolerable pain.      OBJECTIVE: See eval  Observation:   Test measurements:      RESTRICTIONS/PRECAUTIONS: n/a    Exercises/Interventions:   Therapeutic Exercise (47209) Resistance / level Sets/sec Reps Notes       UBE 2' ea direction 4'     Shoulder retractions       Cervical extension SNAG with towel               rows blue 2 10 /18, 30 resistance   Horizontal abduction lime 2 10 18 - monitor muscle

## 2023-06-01 ENCOUNTER — HOSPITAL ENCOUNTER (OUTPATIENT)
Dept: PHYSICAL THERAPY | Age: 33
Setting detail: THERAPIES SERIES
Discharge: HOME | End: 2023-06-01
Payer: COMMERCIAL

## 2023-06-01 PROCEDURE — 97140 MANUAL THERAPY 1/> REGIONS: CPT

## 2023-06-01 PROCEDURE — 97110 THERAPEUTIC EXERCISES: CPT

## 2023-06-01 NOTE — FLOWSHEET NOTE
[address]  Phone: [phone]   Fax: [phone]    Physical Therapy Daily Treatment Note    Date:  2023     Patient Name:  Sallie Field    :  1990  MRN: [id number]  Medical Diagnosis:  Chronic neck pain [M54.2, G89.29]  Chronic upper back pain [M54.9, G89.29]  Treatment Diagnosis: decreased flexibility, tender to palpation, pain, and poor posture associated with chronic cervical and thoracic pain  Insurance/Certification information:   [facility] ($15 copay, 20/year, no auth)  Physician Information:  Tanika Deleon MD    Plan of care signed (Y/N): []  Yes [x]  No     Date of Patient follow up with Physician:      Progress Report: []  Yes  [x]  No     Date Range for reporting period:  Beginnin/15/2023  Ending:     Progress report due (10 Rx/or 30 days whichever is less): visit #10 or  (date)     Recertification due (POC duration/ or 90 days whichever is less): visit #20 or 23 (date)     Visit # Insurance Allowable Auth required? Date Range   6 20 []  Yes  [x]  No -       Units approved Units used Date Range   - - -     Latex Allergy:  [x]NO      []YES  Preferred Language for Healthcare:   [x]English       []other:    Functional Scale:       Date assessed:  NDI: raw score = 9; dysfunction = 18%  5/15/23    Pain level:  0/10     SUBJECTIVE:  Pt reports no pain today. She states that she felt some achy soreness the next day following last session, which resolved after a day. She states she feels the soreness when sitting and when she's thinking about it.     OBJECTIVE: See eval  Observation:   Test measurements:      RESTRICTIONS/PRECAUTIONS: n/a    Exercises/Interventions:   Therapeutic Exercise (43549) Resistance / level Sets/sec Reps Notes       UBE 2' ea direction 4'     Shoulder retractions       Cervical extension SNAG with towel        rows Purple 2 10 ,  resistance, 6/1 band   Horizontal abduction lime 2 10  -

## 2023-06-05 ENCOUNTER — HOSPITAL ENCOUNTER (OUTPATIENT)
Dept: PHYSICAL THERAPY | Age: 33
Setting detail: THERAPIES SERIES
Discharge: HOME | End: 2023-06-05
Payer: COMMERCIAL

## 2023-06-05 PROCEDURE — 97110 THERAPEUTIC EXERCISES: CPT

## 2023-06-05 PROCEDURE — 97140 MANUAL THERAPY 1/> REGIONS: CPT

## 2023-06-05 NOTE — FLOWSHEET NOTE
coordination, kinesthetic sense, posture, motor skill, proprioception and motor activation to allow for proper function of cervical, scapular, scapulothoracic and UE control with self care, carrying, lifting, driving/computer work.      Home Exercise Program:    [] (51118) Reviewed/Progressed HEP activities related to strengthening, flexibility, endurance, ROM of cervical, scapular, scapulothoracic and UE control with self care, reaching, carrying, lifting, house/yardwork, driving/computer work  [] (69981) Reviewed/Progressed HEP activities related to improving balance, coordination, kinesthetic sense, posture, motor skill, proprioception of cervical, scapular, scapulothoracic and UE control with self care, reaching, carrying, lifting, house/yardwork, driving/computer work      Manual Treatments:  PROM / STM / Oscillations-Mobs:  G-I, II, III, IV (PA's, Inf., Post.)  [x] (05231) Provided manual therapy to mobilize soft tissue/joints of cervical/CT, scapular GHJ and UE for the purpose of decreasing headache, modulating pain, promoting relaxation,  increasing ROM, reducing/eliminating soft tissue swelling/inflammation/restriction, improving soft tissue extensibility and allowing for proper ROM for normal function with self care, reaching, carrying, lifting, house/yardwork, driving/computer work    Charges:  Timed Code Treatment Minutes: 42   Total Treatment Minutes: 42       [] EVAL - LOW (58811)   [] EVAL - MOD (24702)  [] EVAL - HIGH (37254)  [] RE-EVAL (91730)  [x] GA(30922) x 2      [] Ionto  [] NMR (52567) x       [] Vaso  [x] Manual (73066) x  1     [] Ultrasound  [] TA x        [] Mech Traction (03506)  [] Aquatic Therapy x     [] ES (un) (11041):   [] Home Management Training x  [] ES(attended) (02003)   [] Dry Needling 1-2 muscles (74934):  [] Dry Needling 3+ muscles (604100  [] Group:      [] Other:     GOALS:  Patient stated goal: to reduce neck pain  [] Progressing: [] Met: [] Not Met: []

## 2023-06-07 ENCOUNTER — OFFICE VISIT (OUTPATIENT)
Dept: PSYCHOLOGY | Age: 33
End: 2023-06-07
Payer: COMMERCIAL

## 2023-06-07 DIAGNOSIS — F33.1 MODERATE EPISODE OF RECURRENT MAJOR DEPRESSIVE DISORDER (HCC): Primary | ICD-10-CM

## 2023-06-07 DIAGNOSIS — F41.1 GAD (GENERALIZED ANXIETY DISORDER): ICD-10-CM

## 2023-06-07 PROCEDURE — 1036F TOBACCO NON-USER: CPT | Performed by: PSYCHOLOGIST

## 2023-06-07 PROCEDURE — 90791 PSYCH DIAGNOSTIC EVALUATION: CPT | Performed by: PSYCHOLOGIST

## 2023-06-07 ASSESSMENT — PATIENT HEALTH QUESTIONNAIRE - PHQ9
10. IF YOU CHECKED OFF ANY PROBLEMS, HOW DIFFICULT HAVE THESE PROBLEMS MADE IT FOR YOU TO DO YOUR WORK, TAKE CARE OF THINGS AT HOME, OR GET ALONG WITH OTHER PEOPLE: 1
8. MOVING OR SPEAKING SO SLOWLY THAT OTHER PEOPLE COULD HAVE NOTICED. OR THE OPPOSITE, BEING SO FIGETY OR RESTLESS THAT YOU HAVE BEEN MOVING AROUND A LOT MORE THAN USUAL: 3
SUM OF ALL RESPONSES TO PHQ QUESTIONS 1-9: 12
7. TROUBLE CONCENTRATING ON THINGS, SUCH AS READING THE NEWSPAPER OR WATCHING TELEVISION: 2
SUM OF ALL RESPONSES TO PHQ QUESTIONS 1-9: 12
SUM OF ALL RESPONSES TO PHQ9 QUESTIONS 1 & 2: 4
2. FEELING DOWN, DEPRESSED OR HOPELESS: 2
4. FEELING TIRED OR HAVING LITTLE ENERGY: 1
3. TROUBLE FALLING OR STAYING ASLEEP: 1
5. POOR APPETITE OR OVEREATING: 0
SUM OF ALL RESPONSES TO PHQ QUESTIONS 1-9: 12
SUM OF ALL RESPONSES TO PHQ QUESTIONS 1-9: 12
1. LITTLE INTEREST OR PLEASURE IN DOING THINGS: 2
9. THOUGHTS THAT YOU WOULD BE BETTER OFF DEAD, OR OF HURTING YOURSELF: 0
6. FEELING BAD ABOUT YOURSELF - OR THAT YOU ARE A FAILURE OR HAVE LET YOURSELF OR YOUR FAMILY DOWN: 1

## 2023-06-07 ASSESSMENT — ANXIETY QUESTIONNAIRES
7. FEELING AFRAID AS IF SOMETHING AWFUL MIGHT HAPPEN: 1
IF YOU CHECKED OFF ANY PROBLEMS ON THIS QUESTIONNAIRE, HOW DIFFICULT HAVE THESE PROBLEMS MADE IT FOR YOU TO DO YOUR WORK, TAKE CARE OF THINGS AT HOME, OR GET ALONG WITH OTHER PEOPLE: SOMEWHAT DIFFICULT
6. BECOMING EASILY ANNOYED OR IRRITABLE: 3
5. BEING SO RESTLESS THAT IT IS HARD TO SIT STILL: 3
2. NOT BEING ABLE TO STOP OR CONTROL WORRYING: 2
4. TROUBLE RELAXING: 2
3. WORRYING TOO MUCH ABOUT DIFFERENT THINGS: 2
1. FEELING NERVOUS, ANXIOUS, OR ON EDGE: 3
GAD7 TOTAL SCORE: 16

## 2023-06-08 ENCOUNTER — HOSPITAL ENCOUNTER (OUTPATIENT)
Dept: PHYSICAL THERAPY | Age: 33
Setting detail: THERAPIES SERIES
Discharge: HOME | End: 2023-06-08
Payer: COMMERCIAL

## 2023-06-08 PROCEDURE — 97140 MANUAL THERAPY 1/> REGIONS: CPT

## 2023-06-08 PROCEDURE — 97110 THERAPEUTIC EXERCISES: CPT

## 2023-06-08 NOTE — FLOWSHEET NOTE
Limitations/Impairments:  []Sitting []Standing   []Walking []Squatting/bending    []Stairs []ADL's    []Transfers []Reaching  []Housework []Lifting  []Driving []Job related tasks  []Sports/Recreation  []Sleeping  []Other:    ASSESSMENT:  Pt tolerated today's session generally well with continued focus on periscapular activation and strengthening and postural control. Progressed a few exercises to promote further periscapular activation and strengthening and improved postural control to which pt tolerated well. Pt reports fatigue > soreness of the posterior neck during SB exercises, and she also reports this feeling when doing the dishes and other specific ADLs - considering this, plan to incorporate cervical extensor strengthening exercises next session in an attempt to rule in/out neck extensor muscular endurance weakness potentially contributing to patient's everyday pain and symptom provocation. Treatment/Activity Tolerance:  [x] Patient able to complete tx  [] Patient limited by fatique  [] Patient limited by pain   [] Patient limited by other medical complications  [] Other:     Prognosis: [x] Good [] Fair  [] Poor    Patient Requires Follow-up: [x] Yes  [] No    PLAN: See eval. PT 2x / week for 10 weeks. [x] Continue per plan of care [] Alter current plan (see comments)  [] Plan of care initiated [] Hold pending MD visit [] Discharge    Electronically signed by: Logan Nichole, PT , DPT, OCS, OMT-C  Therapist was present, directed the patient's care, made skilled judgement, and was responsible for assessment and treatment of the patient. Grace Ashley, SPT    Note: If patient does not return for scheduled/ recommended follow up visits, this note will serve as a discharge from care along with most recent update on progress.

## 2023-06-20 ENCOUNTER — HOSPITAL ENCOUNTER (OUTPATIENT)
Dept: PHYSICAL THERAPY | Age: 33
Setting detail: THERAPIES SERIES
Discharge: HOME | End: 2023-06-20
Payer: COMMERCIAL

## 2023-06-20 PROCEDURE — 97140 MANUAL THERAPY 1/> REGIONS: CPT

## 2023-06-20 PROCEDURE — 97110 THERAPEUTIC EXERCISES: CPT

## 2023-06-20 NOTE — FLOWSHEET NOTE
[facility]  Phone: [phone]   Fax: [phone]    Physical Therapy Daily Treatment Note    Date:  2023     Patient Name:  Bharti Reynoso    :  1990  MRN: [id number]  Medical Diagnosis:  Chronic neck pain [M54.2, G89.29]  Chronic upper back pain [M54.9, G89.29]  Treatment Diagnosis: decreased flexibility, tender to palpation, pain, and poor posture associated with chronic cervical and thoracic pain  Insurance/Certification information:   [facility] ($15 copay, 20/year, no auth)  Physician Information:  Deandre Dumont MD    Plan of care signed (Y/N): [x]  Yes []  No     Date of Patient follow up with Physician:      Progress Report: [x]  Yes  []  No     Date Range for reporting period:  Beginnin/15/2023  Ending:     Progress report due (10 Rx/or 30 days whichever is less): visit #10 or  (date)     Recertification due (POC duration/ or 90 days whichever is less): visit #20 or 23 (date)     Visit # Insurance Allowable Auth required? Date Range   10 20 []  Yes  [x]  No -       Units approved Units used Date Range   - - -     Latex Allergy:  [x]NO      []YES  Preferred Language for Healthcare:   [x]English       []other:    Functional Scale:       Date assessed:  NDI: raw score = 9; dysfunction = 18%  5/15/23  NDI: raw score = 7; dysfunction = 14%  23    Pain level:  0/10     SUBJECTIVE:  Pt states cervical/thoracic symptoms remain persistent primarily towards the end of work shifts and following work shifts. Pt does report overall drops in symptom severity since starting PT. Pt cont's to go through multiple consults as far a possibly seeking surgical breast reduction sx in attempt to alleviate symptoms further.      OBJECTIVE: See eval  Observation:   : bold denotes changes  CERV ROM       Cervical Flexion 45    Cervical Extension 60    Cervical SB 36 (R) 35 (L)   Cervical rotation 105 (R) 105 (L)   No TTP along c-spine or upper

## 2023-06-22 ENCOUNTER — HOSPITAL ENCOUNTER (OUTPATIENT)
Dept: PHYSICAL THERAPY | Age: 33
Setting detail: THERAPIES SERIES
Discharge: HOME | End: 2023-06-22
Payer: COMMERCIAL

## 2023-06-22 NOTE — PROGRESS NOTES
[address]     Physical Therapy  Cancellation/No-show Note  Patient Name:  Merry Chavez  :  1990   Date:  2023  Cancelled visits to date: 1  No-shows to date: 0    Patient status for today's appointment patient:  [x]  Cancelled   []  Rescheduled appointment  []  No-show     Reason given by patient:  []  Patient ill  []  Conflicting appointment  []  No transportation    []  Conflict with work  [x]  No reason given  []  Other:     Comments:      Phone call information:   []  Phone call made today to patient at _ time at number provided:      []  Patient answered, conversation as follows:    []  Patient did not answer, message left as follows:  [x]  Phone call not made today  []  Phone call not needed - pt contacted us to cancel and provided reason for cancellation.      Electronically signed by:  Clarisse Burk, PT, DPT, OCS, OMT-C

## 2023-06-26 PROBLEM — F33.1 MODERATE EPISODE OF RECURRENT MAJOR DEPRESSIVE DISORDER (HCC): Status: ACTIVE | Noted: 2023-06-26

## 2023-06-26 PROBLEM — F41.1 GAD (GENERALIZED ANXIETY DISORDER): Status: ACTIVE | Noted: 2023-06-26

## 2023-06-27 ENCOUNTER — HOSPITAL ENCOUNTER (OUTPATIENT)
Dept: PHYSICAL THERAPY | Age: 33
Setting detail: THERAPIES SERIES
Discharge: HOME | End: 2023-06-27
Payer: COMMERCIAL

## 2023-06-29 ENCOUNTER — HOSPITAL ENCOUNTER (OUTPATIENT)
Dept: PHYSICAL THERAPY | Age: 33
Setting detail: THERAPIES SERIES
Discharge: HOME | End: 2023-06-29
Payer: COMMERCIAL